# Patient Record
Sex: FEMALE | Race: WHITE | Employment: STUDENT | URBAN - METROPOLITAN AREA
[De-identification: names, ages, dates, MRNs, and addresses within clinical notes are randomized per-mention and may not be internally consistent; named-entity substitution may affect disease eponyms.]

---

## 2018-02-26 PROCEDURE — 87340 HEPATITIS B SURFACE AG IA: CPT | Performed by: OBSTETRICS & GYNECOLOGY

## 2018-02-26 PROCEDURE — 86900 BLOOD TYPING SEROLOGIC ABO: CPT | Performed by: OBSTETRICS & GYNECOLOGY

## 2018-02-26 PROCEDURE — 85027 COMPLETE CBC AUTOMATED: CPT | Performed by: OBSTETRICS & GYNECOLOGY

## 2018-02-26 PROCEDURE — 86780 TREPONEMA PALLIDUM: CPT | Performed by: OBSTETRICS & GYNECOLOGY

## 2018-02-26 PROCEDURE — 86762 RUBELLA ANTIBODY: CPT | Performed by: OBSTETRICS & GYNECOLOGY

## 2018-02-26 PROCEDURE — 87389 HIV-1 AG W/HIV-1&-2 AB AG IA: CPT | Performed by: OBSTETRICS & GYNECOLOGY

## 2018-02-26 PROCEDURE — 86850 RBC ANTIBODY SCREEN: CPT | Performed by: OBSTETRICS & GYNECOLOGY

## 2018-02-26 PROCEDURE — 87491 CHLMYD TRACH DNA AMP PROBE: CPT | Performed by: OBSTETRICS & GYNECOLOGY

## 2018-02-26 PROCEDURE — 87591 N.GONORRHOEAE DNA AMP PROB: CPT | Performed by: OBSTETRICS & GYNECOLOGY

## 2018-02-26 PROCEDURE — 36415 COLL VENOUS BLD VENIPUNCTURE: CPT | Performed by: OBSTETRICS & GYNECOLOGY

## 2018-02-26 PROCEDURE — 87086 URINE CULTURE/COLONY COUNT: CPT | Performed by: OBSTETRICS & GYNECOLOGY

## 2018-02-26 PROCEDURE — 86901 BLOOD TYPING SEROLOGIC RH(D): CPT | Performed by: OBSTETRICS & GYNECOLOGY

## 2018-02-26 PROCEDURE — 82950 GLUCOSE TEST: CPT | Performed by: OBSTETRICS & GYNECOLOGY

## 2018-02-26 PROCEDURE — 88175 CYTOPATH C/V AUTO FLUID REDO: CPT | Performed by: OBSTETRICS & GYNECOLOGY

## 2018-02-26 PROCEDURE — 85007 BL SMEAR W/DIFF WBC COUNT: CPT | Performed by: OBSTETRICS & GYNECOLOGY

## 2018-03-01 PROBLEM — O09.33 LATE PRENATAL CARE AFFECTING PREGNANCY IN THIRD TRIMESTER: Status: ACTIVE | Noted: 2018-03-01

## 2018-04-02 PROBLEM — O99.820 GBS (GROUP B STREPTOCOCCUS CARRIER), +RV CULTURE, CURRENTLY PREGNANT: Status: ACTIVE | Noted: 2018-04-02

## 2018-05-09 PROBLEM — O99.820 GBS (GROUP B STREPTOCOCCUS CARRIER), +RV CULTURE, CURRENTLY PREGNANT: Status: RESOLVED | Noted: 2018-04-02 | Resolved: 2018-05-01

## 2018-07-19 PROBLEM — O09.33 LATE PRENATAL CARE AFFECTING PREGNANCY IN THIRD TRIMESTER: Status: RESOLVED | Noted: 2018-03-01 | Resolved: 2018-07-19

## 2019-08-13 PROBLEM — O99.210 OBESITY AFFECTING PREGNANCY, ANTEPARTUM: Status: ACTIVE | Noted: 2019-08-13

## 2019-08-13 PROBLEM — O34.219 PREVIOUS CESAREAN DELIVERY AFFECTING PREGNANCY: Status: ACTIVE | Noted: 2019-08-13

## 2019-08-13 PROCEDURE — 88175 CYTOPATH C/V AUTO FLUID REDO: CPT | Performed by: OBSTETRICS & GYNECOLOGY

## 2019-08-13 PROCEDURE — 87086 URINE CULTURE/COLONY COUNT: CPT | Performed by: OBSTETRICS & GYNECOLOGY

## 2019-09-09 PROCEDURE — 86850 RBC ANTIBODY SCREEN: CPT | Performed by: OBSTETRICS & GYNECOLOGY

## 2019-09-09 PROCEDURE — 86901 BLOOD TYPING SEROLOGIC RH(D): CPT | Performed by: OBSTETRICS & GYNECOLOGY

## 2019-09-09 PROCEDURE — 86900 BLOOD TYPING SEROLOGIC ABO: CPT | Performed by: OBSTETRICS & GYNECOLOGY

## 2019-09-13 PROBLEM — Z28.3 RUBELLA NON-IMMUNE STATUS, ANTEPARTUM: Status: ACTIVE | Noted: 2019-09-13

## 2019-09-13 PROBLEM — O99.891 RUBELLA NON-IMMUNE STATUS, ANTEPARTUM: Status: ACTIVE | Noted: 2019-09-13

## 2019-09-13 PROBLEM — O09.899 RUBELLA NON-IMMUNE STATUS, ANTEPARTUM: Status: ACTIVE | Noted: 2019-09-13

## 2019-09-13 PROBLEM — Z28.39 RUBELLA NON-IMMUNE STATUS, ANTEPARTUM: Status: ACTIVE | Noted: 2019-09-13

## 2020-03-05 PROBLEM — O99.820 GBS (GROUP B STREPTOCOCCUS CARRIER), +RV CULTURE, CURRENTLY PREGNANT: Status: ACTIVE | Noted: 2020-03-05

## 2020-03-15 PROBLEM — O09.629 SUPERVISION OF HIGH-RISK PREGNANCY OF YOUNG MULTIGRAVIDA: Status: ACTIVE | Noted: 2020-03-15

## 2020-03-18 ENCOUNTER — ANESTHESIA (OUTPATIENT)
Dept: OBGYN UNIT | Facility: HOSPITAL | Age: 25
End: 2020-03-18
Payer: COMMERCIAL

## 2020-03-18 ENCOUNTER — ANESTHESIA EVENT (OUTPATIENT)
Dept: OBGYN UNIT | Facility: HOSPITAL | Age: 25
End: 2020-03-18
Payer: COMMERCIAL

## 2020-03-18 ENCOUNTER — HOSPITAL ENCOUNTER (INPATIENT)
Facility: HOSPITAL | Age: 25
LOS: 2 days | Discharge: HOME | End: 2020-03-20
Attending: OBSTETRICS & GYNECOLOGY | Admitting: OBSTETRICS & GYNECOLOGY
Payer: COMMERCIAL

## 2020-03-18 PROBLEM — Z34.90 PREGNANCY: Status: ACTIVE | Noted: 2020-03-18

## 2020-03-18 LAB
ANTIBODY SCREEN: NEGATIVE
DEPRECATED RDW RBC AUTO: 42.8 FL (ref 35.1–46.3)
ERYTHROCYTE [DISTWIDTH] IN BLOOD BY AUTOMATED COUNT: 14.7 % (ref 11–15)
HCT VFR BLD AUTO: 30.9 % (ref 35–48)
HGB BLD-MCNC: 10.4 G/DL (ref 12–16)
MCH RBC QN AUTO: 27.6 PG (ref 26–34)
MCHC RBC AUTO-ENTMCNC: 33.7 G/DL (ref 31–37)
MCV RBC AUTO: 82 FL (ref 80–100)
PLATELET # BLD AUTO: 290 10(3)UL (ref 150–450)
RBC # BLD AUTO: 3.77 X10(6)UL (ref 3.8–5.3)
RH BLOOD TYPE: POSITIVE
WBC # BLD AUTO: 9.6 X10(3) UL (ref 4–11)

## 2020-03-18 PROCEDURE — 90471 IMMUNIZATION ADMIN: CPT

## 2020-03-18 PROCEDURE — 99204 OFFICE O/P NEW MOD 45 MIN: CPT

## 2020-03-18 PROCEDURE — 85027 COMPLETE CBC AUTOMATED: CPT | Performed by: OBSTETRICS & GYNECOLOGY

## 2020-03-18 PROCEDURE — 86901 BLOOD TYPING SEROLOGIC RH(D): CPT | Performed by: OBSTETRICS & GYNECOLOGY

## 2020-03-18 PROCEDURE — 0KQM0ZZ REPAIR PERINEUM MUSCLE, OPEN APPROACH: ICD-10-PCS | Performed by: OBSTETRICS & GYNECOLOGY

## 2020-03-18 PROCEDURE — 86850 RBC ANTIBODY SCREEN: CPT | Performed by: OBSTETRICS & GYNECOLOGY

## 2020-03-18 PROCEDURE — 86900 BLOOD TYPING SEROLOGIC ABO: CPT | Performed by: OBSTETRICS & GYNECOLOGY

## 2020-03-18 RX ORDER — AMMONIA INHALANTS 0.04 G/.3ML
0.3 INHALANT RESPIRATORY (INHALATION) AS NEEDED
Status: DISCONTINUED | OUTPATIENT
Start: 2020-03-18 | End: 2020-03-18 | Stop reason: HOSPADM

## 2020-03-18 RX ORDER — AMMONIA INHALANTS 0.04 G/.3ML
0.3 INHALANT RESPIRATORY (INHALATION) AS NEEDED
Status: DISCONTINUED | OUTPATIENT
Start: 2020-03-18 | End: 2020-03-20

## 2020-03-18 RX ORDER — BUPIVACAINE HYDROCHLORIDE 2.5 MG/ML
15 INJECTION, SOLUTION EPIDURAL; INFILTRATION; INTRACAUDAL ONCE
Status: DISCONTINUED | OUTPATIENT
Start: 2020-03-18 | End: 2020-03-20

## 2020-03-18 RX ORDER — SIMETHICONE 80 MG
80 TABLET,CHEWABLE ORAL 3 TIMES DAILY PRN
Status: DISCONTINUED | OUTPATIENT
Start: 2020-03-18 | End: 2020-03-20

## 2020-03-18 RX ORDER — TERBUTALINE SULFATE 1 MG/ML
0.25 INJECTION, SOLUTION SUBCUTANEOUS AS NEEDED
Status: DISCONTINUED | OUTPATIENT
Start: 2020-03-18 | End: 2020-03-18 | Stop reason: HOSPADM

## 2020-03-18 RX ORDER — SODIUM CHLORIDE 0.9 % (FLUSH) 0.9 %
10 SYRINGE (ML) INJECTION AS NEEDED
Status: DISCONTINUED | OUTPATIENT
Start: 2020-03-18 | End: 2020-03-20

## 2020-03-18 RX ORDER — LIDOCAINE HYDROCHLORIDE 10 MG/ML
INJECTION, SOLUTION EPIDURAL; INFILTRATION; INTRACAUDAL; PERINEURAL AS NEEDED
Status: DISCONTINUED | OUTPATIENT
Start: 2020-03-18 | End: 2020-03-18 | Stop reason: SURG

## 2020-03-18 RX ORDER — CHOLECALCIFEROL (VITAMIN D3) 25 MCG
1 TABLET,CHEWABLE ORAL DAILY
Status: DISCONTINUED | OUTPATIENT
Start: 2020-03-18 | End: 2020-03-20

## 2020-03-18 RX ORDER — SODIUM CHLORIDE 0.9 % (FLUSH) 0.9 %
10 SYRINGE (ML) INJECTION AS NEEDED
Status: DISCONTINUED | OUTPATIENT
Start: 2020-03-18 | End: 2020-03-18 | Stop reason: HOSPADM

## 2020-03-18 RX ORDER — ONDANSETRON 2 MG/ML
4 INJECTION INTRAMUSCULAR; INTRAVENOUS EVERY 6 HOURS PRN
Status: DISCONTINUED | OUTPATIENT
Start: 2020-03-18 | End: 2020-03-20

## 2020-03-18 RX ORDER — BUPIVACAINE HYDROCHLORIDE 2.5 MG/ML
INJECTION, SOLUTION EPIDURAL; INFILTRATION; INTRACAUDAL AS NEEDED
Status: DISCONTINUED | OUTPATIENT
Start: 2020-03-18 | End: 2020-03-18 | Stop reason: SURG

## 2020-03-18 RX ORDER — BISACODYL 10 MG
10 SUPPOSITORY, RECTAL RECTAL ONCE AS NEEDED
Status: DISCONTINUED | OUTPATIENT
Start: 2020-03-18 | End: 2020-03-20

## 2020-03-18 RX ORDER — DEXTROSE, SODIUM CHLORIDE, SODIUM LACTATE, POTASSIUM CHLORIDE, AND CALCIUM CHLORIDE 5; .6; .31; .03; .02 G/100ML; G/100ML; G/100ML; G/100ML; G/100ML
INJECTION, SOLUTION INTRAVENOUS AS NEEDED
Status: DISCONTINUED | OUTPATIENT
Start: 2020-03-18 | End: 2020-03-18 | Stop reason: HOSPADM

## 2020-03-18 RX ORDER — DOCUSATE SODIUM 100 MG/1
100 CAPSULE, LIQUID FILLED ORAL 2 TIMES DAILY
Status: DISCONTINUED | OUTPATIENT
Start: 2020-03-18 | End: 2020-03-20

## 2020-03-18 RX ORDER — ACETAMINOPHEN 500 MG
500 TABLET ORAL ONCE AS NEEDED
Status: DISCONTINUED | OUTPATIENT
Start: 2020-03-18 | End: 2020-03-18 | Stop reason: HOSPADM

## 2020-03-18 RX ORDER — LIDOCAINE HYDROCHLORIDE AND EPINEPHRINE 20; 5 MG/ML; UG/ML
20 INJECTION, SOLUTION EPIDURAL; INFILTRATION; INTRACAUDAL; PERINEURAL ONCE
Status: DISCONTINUED | OUTPATIENT
Start: 2020-03-18 | End: 2020-03-20

## 2020-03-18 RX ORDER — IBUPROFEN 600 MG/1
600 TABLET ORAL EVERY 6 HOURS
Status: DISCONTINUED | OUTPATIENT
Start: 2020-03-18 | End: 2020-03-20

## 2020-03-18 RX ORDER — LIDOCAINE HYDROCHLORIDE 10 MG/ML
30 INJECTION, SOLUTION EPIDURAL; INFILTRATION; INTRACAUDAL; PERINEURAL ONCE
Status: DISCONTINUED | OUTPATIENT
Start: 2020-03-18 | End: 2020-03-18 | Stop reason: HOSPADM

## 2020-03-18 RX ORDER — IBUPROFEN 600 MG/1
600 TABLET ORAL ONCE AS NEEDED
Status: DISCONTINUED | OUTPATIENT
Start: 2020-03-18 | End: 2020-03-18 | Stop reason: HOSPADM

## 2020-03-18 RX ORDER — TRISODIUM CITRATE DIHYDRATE AND CITRIC ACID MONOHYDRATE 500; 334 MG/5ML; MG/5ML
30 SOLUTION ORAL AS NEEDED
Status: DISCONTINUED | OUTPATIENT
Start: 2020-03-18 | End: 2020-03-18 | Stop reason: HOSPADM

## 2020-03-18 RX ORDER — LIDOCAINE HYDROCHLORIDE AND EPINEPHRINE 15; 5 MG/ML; UG/ML
INJECTION, SOLUTION EPIDURAL AS NEEDED
Status: DISCONTINUED | OUTPATIENT
Start: 2020-03-18 | End: 2020-03-18 | Stop reason: SURG

## 2020-03-18 RX ORDER — ACETAMINOPHEN 325 MG/1
650 TABLET ORAL EVERY 6 HOURS PRN
Status: DISCONTINUED | OUTPATIENT
Start: 2020-03-18 | End: 2020-03-20

## 2020-03-18 RX ORDER — DIPHENHYDRAMINE HYDROCHLORIDE 50 MG/ML
12.5 INJECTION INTRAMUSCULAR; INTRAVENOUS EVERY 4 HOURS PRN
Status: DISCONTINUED | OUTPATIENT
Start: 2020-03-18 | End: 2020-03-20

## 2020-03-18 RX ORDER — DIAPER,BRIEF,INFANT-TODD,DISP
1 EACH MISCELLANEOUS EVERY 6 HOURS PRN
Status: DISCONTINUED | OUTPATIENT
Start: 2020-03-18 | End: 2020-03-20

## 2020-03-18 RX ORDER — SODIUM CHLORIDE, SODIUM LACTATE, POTASSIUM CHLORIDE, CALCIUM CHLORIDE 600; 310; 30; 20 MG/100ML; MG/100ML; MG/100ML; MG/100ML
INJECTION, SOLUTION INTRAVENOUS CONTINUOUS
Status: DISCONTINUED | OUTPATIENT
Start: 2020-03-18 | End: 2020-03-18 | Stop reason: HOSPADM

## 2020-03-18 RX ORDER — EPHEDRINE SULFATE/0.9% NACL/PF 25 MG/5 ML
5 SYRINGE (ML) INTRAVENOUS AS NEEDED
Status: DISCONTINUED | OUTPATIENT
Start: 2020-03-18 | End: 2020-03-20

## 2020-03-18 RX ADMIN — BUPIVACAINE HYDROCHLORIDE 5 MG: 2.5 INJECTION, SOLUTION EPIDURAL; INFILTRATION; INTRACAUDAL at 06:10:00

## 2020-03-18 RX ADMIN — LIDOCAINE HYDROCHLORIDE 3 ML: 10 INJECTION, SOLUTION EPIDURAL; INFILTRATION; INTRACAUDAL; PERINEURAL at 06:07:00

## 2020-03-18 RX ADMIN — LIDOCAINE HYDROCHLORIDE AND EPINEPHRINE 5 ML: 15; 5 INJECTION, SOLUTION EPIDURAL at 06:11:00

## 2020-03-18 NOTE — PROGRESS NOTES
Pt is a 22year old female admitted to TR1/TR1-A. Patient presents with:  Contractions: since      Pt is  39w5d intra-uterine pregnancy. History obtained, consents signed. Oriented to room, staff, and plan of care.

## 2020-03-18 NOTE — PROGRESS NOTES
Received patient into room 357 via WC . Bedside shift report received from [facility]. Pt transferred to bed. Bed in lowest and locked position. Side rails up x2. VSS. IV site unremarkable. Baby present in open crib. ID bands matched with L&D RN.   Patient

## 2020-03-18 NOTE — PROGRESS NOTES
Pt is a 22year old female admitted to [place] [address]. Patient presents with:  Contractions: since      Pt is  39w5d intra-uterine pregnancy. History obtained, consents signed. Oriented to room, staff, and plan of care.

## 2020-03-18 NOTE — PROGRESS NOTES
After VE, encouraged pt to have epidural.  Pt c/o urge to push informed of cervix at lip.   Pt agrees to epidural.

## 2020-03-18 NOTE — PROGRESS NOTES
Patient up to bathroom with assist x 2. Voided 200. Patient transferred to mother/baby room 357 per wheelchair in stable condition with baby and personal belongings. Accompanied by significant other and staff. Report given to mother/baby AMADO Erickson.

## 2020-03-18 NOTE — H&P
[address] Patient Status:  Observation    3/10/1995 MRN [id number]   Location [address] [place] [address] Attending Allyn Sandifer, MD   Hosp Day # 0 PCP Glendy Jackson MD     Date of A Social History    Tobacco Use      Smoking status: Former Smoker        Packs/day: 0.00        Quit date: 2/15/2018        Years since quittin.0      Smokeless tobacco: Never Used    Alcohol use:  Yes      Alcohol/week: 0.0 standard drinks      Comment:

## 2020-03-18 NOTE — ANESTHESIA PROCEDURE NOTES
Labor Analgesia  Performed by: Erica Ware MD  Authorized by: Erica Ware MD       General Information and Staff    Start Time:  3/18/2020 6:00 AM  End Time:  3/18/2020 6:10 AM  Anesthesiologist:  Erica Ware MD  Performed by:   Anesthesiologist

## 2020-03-18 NOTE — L&D DELIVERY NOTE
[facility]OSP - [facility]    Vaginal Delivery Note    Nirav Lint Patient Status:  Inpatient    3/10/1995 MRN [id number]   Location [address] Attending Aby Abarca MD   Hosp Day # 0 PCP Kassie Silverman MD     Delivery

## 2020-03-18 NOTE — ANESTHESIA PREPROCEDURE EVALUATION
Anesthesia PreOp Note    HPI:     Cinthya Rainey is a 22year old female who presents for preoperative consultation requested by: * No surgeons listed *    Date of Surgery: 3/18/2020    * No procedures listed *  Indication: * No pre-op diagnosis entered * MD  ibuprofen (MOTRIN) tab 600 mg, 600 mg, Oral, Once PRN, Allayne Pallas, MD  oxyTOCIN (PITOCIN) 30 units/ 500 ml 0.9% NS premix infusion, 300 mL/hr, Intravenous, Continuous, AdeHeather lopez MD  Terbutaline Sulfate (BRETHINE) 1 MG/ML injection 0.25 mg, Not on file      Number of children: 1      Years of education: Not on file      Highest education level: Not on file    Occupational History      Not on file    Social Needs      Financial resource strain: Not on file      Food insecurity:        Worry: N MCHC 32.8 12/23/2019    RDW 14.7 03/18/2020    RDW 12.6 12/23/2019    .0 03/18/2020     12/23/2019             Vital Signs: There is no height or weight on file to calculate BMI. oral temperature is 98.3 °F (36.8 °C).  Her blood pressure

## 2020-03-19 PROBLEM — O34.219 PREVIOUS CESAREAN DELIVERY AFFECTING PREGNANCY: Status: RESOLVED | Noted: 2019-08-13 | Resolved: 2020-03-18

## 2020-03-19 PROBLEM — O99.820 GBS (GROUP B STREPTOCOCCUS CARRIER), +RV CULTURE, CURRENTLY PREGNANT: Status: RESOLVED | Noted: 2020-03-05 | Resolved: 2020-03-18

## 2020-03-19 LAB
BASOPHILS # BLD AUTO: 0.02 X10(3) UL (ref 0–0.2)
BASOPHILS NFR BLD AUTO: 0.2 %
DEPRECATED RDW RBC AUTO: 46.5 FL (ref 35.1–46.3)
EOSINOPHIL # BLD AUTO: 0.09 X10(3) UL (ref 0–0.7)
EOSINOPHIL NFR BLD AUTO: 0.9 %
ERYTHROCYTE [DISTWIDTH] IN BLOOD BY AUTOMATED COUNT: 15.5 % (ref 11–15)
HCT VFR BLD AUTO: 26.2 % (ref 35–48)
HGB BLD-MCNC: 8.6 G/DL (ref 12–16)
IMM GRANULOCYTES # BLD AUTO: 0.09 X10(3) UL (ref 0–1)
IMM GRANULOCYTES NFR BLD: 0.9 %
LYMPHOCYTES # BLD AUTO: 1.64 X10(3) UL (ref 1–4)
LYMPHOCYTES NFR BLD AUTO: 16.4 %
MCH RBC QN AUTO: 27.4 PG (ref 26–34)
MCHC RBC AUTO-ENTMCNC: 32.8 G/DL (ref 31–37)
MCV RBC AUTO: 83.4 FL (ref 80–100)
MONOCYTES # BLD AUTO: 0.5 X10(3) UL (ref 0.1–1)
MONOCYTES NFR BLD AUTO: 5 %
NEUTROPHILS # BLD AUTO: 7.68 X10 (3) UL (ref 1.5–7.7)
NEUTROPHILS # BLD AUTO: 7.68 X10(3) UL (ref 1.5–7.7)
NEUTROPHILS NFR BLD AUTO: 76.6 %
PLATELET # BLD AUTO: 237 10(3)UL (ref 150–450)
RBC # BLD AUTO: 3.14 X10(6)UL (ref 3.8–5.3)
WBC # BLD AUTO: 10 X10(3) UL (ref 4–11)

## 2020-03-19 PROCEDURE — 85025 COMPLETE CBC W/AUTO DIFF WBC: CPT | Performed by: OBSTETRICS & GYNECOLOGY

## 2020-03-19 NOTE — LACTATION NOTE
LACTATION NOTE - MOTHER      Evaluation Type: Inpatient    Problems identified  Problems identified: Knowledge deficit    Maternal history  Other/comment: ADHD, asthma    Breastfeeding goal  Breastfeeding goal: To maintain breast milk feeding per patient g

## 2020-03-19 NOTE — PROGRESS NOTES
Pt doing well, pain controllled, td wnl   20  0936   BP: 121/52   Pulse: 87   Resp: 18   Temp: 97.6 °F (36.4 °C)     Gen: nad    A/P; 22year old  PPD 1 s/p   1) doing well,    D/w pt r/b/a of circumcision, will proceed.  All questions a

## 2020-03-19 NOTE — PLAN OF CARE
Sat with patient to review plan of care. Discussed analgesic options and answered all questions. VSS. Breastfeeding on demand. Breastfeeding successfully. Voiding independently. Lochia is WNL. Uterus is firm.      Problem: PAIN - ADULT  Goal: Verbalizes/dis and use personal protective equipment as indicated. Ensure aseptic care of all intravenous lines and invasive tubes/drains.  - Obtain immunization and exposure to communicable diseases history.   Outcome: Progressing  Goal: Optimize infant feeding at the br equipment/supplies, instructions, and assistance until it is safe to breastfeed infant. Outcome: Progressing  Goal: Experiences normal breast weaning course  Description  INTERVENTIONS:  - Assess for and manage engorgement. - Instruct on breast care.   -

## 2020-03-20 VITALS
RESPIRATION RATE: 16 BRPM | TEMPERATURE: 98 F | OXYGEN SATURATION: 97 % | DIASTOLIC BLOOD PRESSURE: 70 MMHG | BODY MASS INDEX: 43 KG/M2 | HEART RATE: 90 BPM | SYSTOLIC BLOOD PRESSURE: 125 MMHG | WEIGHT: 252 LBS

## 2020-03-20 RX ORDER — IBUPROFEN 600 MG/1
600 TABLET ORAL EVERY 6 HOURS
Qty: 30 TABLET | Refills: 0 | Status: SHIPPED | OUTPATIENT
Start: 2020-03-20 | End: 2020-05-01

## 2020-03-20 NOTE — PROGRESS NOTES
[facility]    OB/Gyne Post  Progress Note      Sheyla Duval Patient Status:  Inpatient    3/10/1995 MRN [id number]   Location [facility] 3SE Attending Suki Alexander MD   Hosp Day # 2 PCP Dina Hu MD       Subjective

## 2020-03-20 NOTE — PLAN OF CARE
Problem: POSTPARTUM  Goal: Long Term Goal:Experiences normal postpartum course  Description  INTERVENTIONS:  - Assess and monitor vital signs and lab values. - Assess fundus and lochia. - Provide ice/sitz baths for perineum discomfort.   - Monitor heali for signs of nipple pain/trauma. - Instruct and provide assistance with proper latch. - Review techniques for milk expression (breast pumping) and storage of breast milk. Provide pumping equipment/supplies, instructions and assistance, as needed.   Annika Tang

## 2020-03-20 NOTE — CM/SW NOTE
MDO received for EPDS 9. SW met with the pt, alone with baby on her chest. Baby boy Brandi Guillen. Big brother is 3years old. Pt confirmed address, phone as listed on the facesheet.  Her insurance [employer] [facility]) is through her stepdad's employ

## 2020-03-20 NOTE — DISCHARGE SUMMARY
[facility]    Obstetrical Discharge Summary    Mary Sanford Patient Status:  Inpatient    3/10/1995 MRN [id number]   Location [facility] 3SE Attending Antonella Locke MD   Hosp Day # 2 PCP Antonia Campuzano MD     Date of Admiss hour(s))   CBC, PLATELET; NO DIFFERENTIAL    Collection Time: 03/18/20  4:35 AM   Result Value Ref Range    WBC 9.6 4.0 - 11.0 x10(3) uL    RBC 3.77 (L) 3.80 - 5.30 x10(6)uL    HGB 10.4 (L) 12.0 - 16.0 g/dL    HCT 30.9 (L) 35.0 - 48.0 %    MCV 82.0 80.0 - 6 (six) hours. Home Meds - Unchanged    Prenatal Multivit-Min-Fe-FA (PRENATAL OR)  Take 1 tablet by mouth daily. Discharge Diet: As tolerated    Discharge Activity: As tolerated    Follow up:      Follow-up Information     Lorenza Ruiz

## 2020-03-20 NOTE — ANESTHESIA POSTPROCEDURE EVALUATION
Patient: Thuy Quinn    Procedure Summary     Date:  03/18/20 Room / Location:      Anesthesia Start:  0600 Anesthesia Stop:  8914    Procedure:  LABOR ANALGESIA Diagnosis:      Scheduled Providers:   Anesthesiologist:  Ila Rodriguez MD    Anesthesia Typ

## 2020-03-23 ENCOUNTER — TELEPHONE (OUTPATIENT)
Dept: OBGYN UNIT | Facility: HOSPITAL | Age: 25
End: 2020-03-23

## 2020-03-25 ENCOUNTER — TELEPHONE (OUTPATIENT)
Dept: OBGYN UNIT | Facility: HOSPITAL | Age: 25
End: 2020-03-25

## 2021-08-02 PROBLEM — Z28.39 RUBELLA NON-IMMUNE STATUS, ANTEPARTUM: Status: RESOLVED | Noted: 2019-09-13 | Resolved: 2021-08-02

## 2021-08-02 PROBLEM — O09.899 RUBELLA NON-IMMUNE STATUS, ANTEPARTUM: Status: RESOLVED | Noted: 2019-09-13 | Resolved: 2021-08-02

## 2021-08-02 PROBLEM — Z28.3 RUBELLA NON-IMMUNE STATUS, ANTEPARTUM: Status: RESOLVED | Noted: 2019-09-13 | Resolved: 2021-08-02

## 2021-08-02 PROBLEM — O99.891 RUBELLA NON-IMMUNE STATUS, ANTEPARTUM: Status: RESOLVED | Noted: 2019-09-13 | Resolved: 2021-08-02

## 2021-09-02 PROBLEM — O34.219 HX SUCCESSFUL VBAC (VAGINAL BIRTH AFTER CESAREAN), CURRENTLY PREGNANT: Status: ACTIVE | Noted: 2021-09-02

## 2021-09-29 ENCOUNTER — HOSPITAL ENCOUNTER (OUTPATIENT)
Dept: PERINATAL CARE | Facility: HOSPITAL | Age: 26
Discharge: HOME | End: 2021-09-29
Attending: OBSTETRICS & GYNECOLOGY
Payer: COMMERCIAL

## 2021-09-29 ENCOUNTER — APPOINTMENT (OUTPATIENT)
Dept: OBGYN CLINIC | Facility: HOSPITAL | Age: 26
End: 2021-09-29
Payer: COMMERCIAL

## 2021-09-29 ENCOUNTER — HOSPITAL ENCOUNTER (INPATIENT)
Facility: HOSPITAL | Age: 26
LOS: 1 days | Discharge: HOME | End: 2021-09-29
Attending: OBSTETRICS & GYNECOLOGY | Admitting: OBSTETRICS & GYNECOLOGY
Payer: COMMERCIAL

## 2021-09-29 VITALS — DIASTOLIC BLOOD PRESSURE: 80 MMHG | SYSTOLIC BLOOD PRESSURE: 127 MMHG | HEART RATE: 91 BPM

## 2021-09-29 VITALS
TEMPERATURE: 98 F | RESPIRATION RATE: 16 BRPM | DIASTOLIC BLOOD PRESSURE: 74 MMHG | HEART RATE: 89 BPM | SYSTOLIC BLOOD PRESSURE: 128 MMHG

## 2021-09-29 DIAGNOSIS — O09.629 SUPERVISION OF HIGH-RISK PREGNANCY OF YOUNG MULTIGRAVIDA: ICD-10-CM

## 2021-09-29 DIAGNOSIS — O99.210 OBESITY AFFECTING PREGNANCY, ANTEPARTUM: ICD-10-CM

## 2021-09-29 DIAGNOSIS — O36.4XX0 FETAL DEMISE BEFORE 22 WEEKS WITH RETENTION OF DEAD FETUS: Primary | ICD-10-CM

## 2021-09-29 DIAGNOSIS — O36.4XX0 FETAL DEMISE BEFORE 22 WEEKS WITH RETENTION OF DEAD FETUS: ICD-10-CM

## 2021-09-29 DIAGNOSIS — O02.1 FETAL DEMISE BEFORE 20 WEEKS WITH RETENTION OF DEAD FETUS: ICD-10-CM

## 2021-09-29 PROCEDURE — 85730 THROMBOPLASTIN TIME PARTIAL: CPT | Performed by: OBSTETRICS & GYNECOLOGY

## 2021-09-29 PROCEDURE — 85460 HEMOGLOBIN FETAL: CPT | Performed by: OBSTETRICS & GYNECOLOGY

## 2021-09-29 PROCEDURE — 86901 BLOOD TYPING SEROLOGIC RH(D): CPT | Performed by: OBSTETRICS & GYNECOLOGY

## 2021-09-29 PROCEDURE — 86747 PARVOVIRUS ANTIBODY: CPT | Performed by: OBSTETRICS & GYNECOLOGY

## 2021-09-29 PROCEDURE — 10907ZC DRAINAGE OF AMNIOTIC FLUID, THERAPEUTIC FROM PRODUCTS OF CONCEPTION, VIA NATURAL OR ARTIFICIAL OPENING: ICD-10-PCS | Performed by: OBSTETRICS & GYNECOLOGY

## 2021-09-29 PROCEDURE — 86644 CMV ANTIBODY: CPT | Performed by: OBSTETRICS & GYNECOLOGY

## 2021-09-29 PROCEDURE — 86146 BETA-2 GLYCOPROTEIN ANTIBODY: CPT | Performed by: OBSTETRICS & GYNECOLOGY

## 2021-09-29 PROCEDURE — 88305 TISSUE EXAM BY PATHOLOGIST: CPT | Performed by: OBSTETRICS & GYNECOLOGY

## 2021-09-29 PROCEDURE — 76946 ECHO GUIDE FOR AMNIOCENTESIS: CPT | Performed by: OBSTETRICS & GYNECOLOGY

## 2021-09-29 PROCEDURE — 86694 HERPES SIMPLEX NES ANTBDY: CPT | Performed by: OBSTETRICS & GYNECOLOGY

## 2021-09-29 PROCEDURE — 3E0P7VZ INTRODUCTION OF HORMONE INTO FEMALE REPRODUCTIVE, VIA NATURAL OR ARTIFICIAL OPENING: ICD-10-PCS | Performed by: OBSTETRICS & GYNECOLOGY

## 2021-09-29 PROCEDURE — 83036 HEMOGLOBIN GLYCOSYLATED A1C: CPT | Performed by: OBSTETRICS & GYNECOLOGY

## 2021-09-29 PROCEDURE — 86645 CMV ANTIBODY IGM: CPT | Performed by: OBSTETRICS & GYNECOLOGY

## 2021-09-29 PROCEDURE — 99213 OFFICE O/P EST LOW 20 MIN: CPT | Performed by: OBSTETRICS & GYNECOLOGY

## 2021-09-29 PROCEDURE — 85384 FIBRINOGEN ACTIVITY: CPT | Performed by: OBSTETRICS & GYNECOLOGY

## 2021-09-29 PROCEDURE — 87798 DETECT AGENT NOS DNA AMP: CPT | Performed by: OBSTETRICS & GYNECOLOGY

## 2021-09-29 PROCEDURE — 85613 RUSSELL VIPER VENOM DILUTED: CPT | Performed by: OBSTETRICS & GYNECOLOGY

## 2021-09-29 PROCEDURE — 85610 PROTHROMBIN TIME: CPT | Performed by: OBSTETRICS & GYNECOLOGY

## 2021-09-29 PROCEDURE — 85598 HEXAGNAL PHOSPH PLTLT NEUTRL: CPT | Performed by: OBSTETRICS & GYNECOLOGY

## 2021-09-29 PROCEDURE — 86147 CARDIOLIPIN ANTIBODY EA IG: CPT | Performed by: OBSTETRICS & GYNECOLOGY

## 2021-09-29 PROCEDURE — 76815 OB US LIMITED FETUS(S): CPT | Performed by: OBSTETRICS & GYNECOLOGY

## 2021-09-29 PROCEDURE — 85025 COMPLETE CBC W/AUTO DIFF WBC: CPT | Performed by: OBSTETRICS & GYNECOLOGY

## 2021-09-29 PROCEDURE — 59000 AMNIOCENTESIS DIAGNOSTIC: CPT | Performed by: OBSTETRICS & GYNECOLOGY

## 2021-09-29 PROCEDURE — 86762 RUBELLA ANTIBODY: CPT | Performed by: OBSTETRICS & GYNECOLOGY

## 2021-09-29 PROCEDURE — 85390 FIBRINOLYSINS SCREEN I&R: CPT | Performed by: OBSTETRICS & GYNECOLOGY

## 2021-09-29 PROCEDURE — 86900 BLOOD TYPING SEROLOGIC ABO: CPT | Performed by: OBSTETRICS & GYNECOLOGY

## 2021-09-29 PROCEDURE — 84443 ASSAY THYROID STIM HORMONE: CPT | Performed by: OBSTETRICS & GYNECOLOGY

## 2021-09-29 PROCEDURE — 86850 RBC ANTIBODY SCREEN: CPT | Performed by: OBSTETRICS & GYNECOLOGY

## 2021-09-29 RX ORDER — ONDANSETRON 2 MG/ML
4 INJECTION INTRAMUSCULAR; INTRAVENOUS EVERY 6 HOURS PRN
Status: DISCONTINUED | OUTPATIENT
Start: 2021-09-29 | End: 2021-09-30

## 2021-09-29 RX ORDER — IBUPROFEN 600 MG/1
600 TABLET ORAL EVERY 6 HOURS PRN
Status: DISCONTINUED | OUTPATIENT
Start: 2021-09-29 | End: 2021-09-30

## 2021-09-29 RX ORDER — ACETAMINOPHEN 500 MG
500 TABLET ORAL EVERY 6 HOURS PRN
Qty: 60 TABLET | Refills: 0 | Status: SHIPPED | OUTPATIENT
Start: 2021-09-29

## 2021-09-29 RX ORDER — IBUPROFEN 600 MG/1
600 TABLET ORAL EVERY 6 HOURS PRN
Qty: 60 TABLET | Refills: 0 | Status: SHIPPED | OUTPATIENT
Start: 2021-09-29 | End: 2021-11-16

## 2021-09-29 RX ORDER — ACETAMINOPHEN 500 MG
500 TABLET ORAL EVERY 6 HOURS PRN
Status: DISCONTINUED | OUTPATIENT
Start: 2021-09-29 | End: 2021-09-30

## 2021-09-29 RX ORDER — MISOPROSTOL 100 UG/1
100 TABLET ORAL EVERY 6 HOURS
Status: DISCONTINUED | OUTPATIENT
Start: 2021-09-29 | End: 2021-09-30

## 2021-09-29 NOTE — SPIRITUAL CARE NOTE
I will consider this complete and await a call from [facility] as to what to do.

## 2021-09-29 NOTE — CM/SW NOTE
MDO; IUFD    Patient had IOL for IUFD confirm with fetus at ~16wks size yesterday/US-M. Per RN/Joan pt is currently not ready for CM visit.   However, per nurse there are handouts in patient folder on \"SHARE\" program that she will point out to pa

## 2021-09-29 NOTE — PROGRESS NOTES
Pt for Diagnostic Amnio  Hx IUFD  Pre and post procedure instructions discussed handout given  Consent signed  Labs drawn per [employer] request

## 2021-09-29 NOTE — PROGRESS NOTES
Reason for Consult:   Dear Dr. Jonathon Bhagat,    Thank you for requesting ultrasound evaluation and maternal fetal medicine consultation on [address] [place] [address]. As you are aware she is a 32year old female with a Pearl pregnancy at 19w4d.   A maternal-fetal med past   • Asthma     childhood-inhaler   • Depression     stopped prozac 18   • Nausea & vomiting    • Varicella     Immunized      Past Surgical History:   Procedure Laterality Date   •      • OTHER SURGICAL HISTORY      wisdom teeth ULTRASOUND REPORT   See imaging tab for complete ultrasound report or in PACS    Single IUP in breech presentation. Placenta is posterior. Cardiac activity is absent. IUFD. Fetal [place] sign seen.     Amniocentesis  Start 8:03 AM. End 8:04 AM  Instr

## 2021-09-29 NOTE — PROGRESS NOTES
Pt is a 32year old female admitted to 371/371-A. Patient presents with:  Iufd: 20 3/7 weeks     Pt is  20w3d intra-uterine pregnancy. History obtained, consents signed. Oriented to room, staff, and plan of care.

## 2021-09-29 NOTE — H&P
[facility] Patient Status:  Inpatient    3/10/1995 MRN [id number]   Location [address] Attending Mo Myles MD   Hosp Day # 0 PCP Anastasia Winchester MD     Date of A • OTHER SURGICAL HISTORY      wisdom teeth     Family History:   Family History   Problem Relation Age of Onset   • Diabetes Paternal Grandmother    • Heart Surgery Paternal Grandmother    • Arthritis Paternal Grandmother    • Heart Disease Maternal [facility] [place] Hepatitis B surface antigen non-reactive  Lab Results   Component Value Date    WBC 8.0 09/29/2021    HGB 11.0 09/29/2021    HCT 33.2 09/29/2021    .0 09/29/2021    PTT 35.8 09/29/2021    PTT 33.7 09/29/2021    INR 1.10 09/29/2021    TSH 0.961 09/29

## 2021-09-29 NOTE — SPIRITUAL CARE NOTE
Subjective: Went to floor and Pt was not ready for  visit according to nurse. Pratik Mckinney)- She will call me when Sarah Many wishes to see . Objective; Await nurse contact instructions. ..  Did charting and visitation to 5W while awaiting call r

## 2021-09-30 NOTE — L&D DELIVERY NOTE
Feliciano Kaur, Pending FD [[id number]]    Labor Events     labor?: No   steroids?: None  Cervical ripening date/time: 2021 1011  Cervical ripening type: Misoprostol  Antibiotics received during labor?: No  Antibiotics (enter # doses in comment) Attending Carline Haile MD   Hosp Day # 0 PCP Isaak Pierce MD     Delivery     Infant  Date of Delivery: 9/29/2021   Time of Delivery: 5:55 PM  Delivery Type:      Infant Sex/Birthweight: child No birth weight on file.      Presentation    Position

## 2021-09-30 NOTE — PROGRESS NOTES
Discharge orders received. All paperwork filled out and appropriate personnel notified. Mementoes and grief folder given to parents. All questions answered and AVS explained. Parents agreeable to discharge. Able to ambulate off unit independently.

## 2021-09-30 NOTE — DISCHARGE SUMMARY
[facility]    Discharge Summary    Mel Smith Patient Status:  Inpatient    3/10/1995 MRN [id number]   Location [address] Attending Dejan Lorenz MD   Hosp Day # 0 PCP Yocasta Lyons MD     Date of with provider. Discharge Diet: As tolerated    Discharge Activity: As tolerated    Follow up: Follow-up Information     Shan Chu MD In 2 weeks.     Specialty: OBSTETRICS & GYNECOLOGY  Contact information:  [address] [place] [address]

## 2021-09-30 NOTE — PROGRESS NOTES
09/29/21 2000   Clinical Encounter Type   Visited With Patient and family together; Health care provider   Crisis Visit Death   Referral From Nurse   Referral To    Patient Spiritual Encounters   Feelings of Loneliness/Hopelessness Sadness   Fam

## 2021-10-05 ENCOUNTER — TELEPHONE (OUTPATIENT)
Dept: OBGYN UNIT | Facility: HOSPITAL | Age: 26
End: 2021-10-05

## 2021-10-13 ENCOUNTER — TELEPHONE (OUTPATIENT)
Dept: OBGYN UNIT | Facility: HOSPITAL | Age: 26
End: 2021-10-13

## 2021-10-27 ENCOUNTER — TELEPHONE (OUTPATIENT)
Dept: PERINATAL CARE | Facility: HOSPITAL | Age: 26
End: 2021-10-27

## 2021-11-02 ENCOUNTER — TELEPHONE (OUTPATIENT)
Dept: OBGYN UNIT | Facility: HOSPITAL | Age: 26
End: 2021-11-02

## 2021-11-16 PROBLEM — Z34.90 PREGNANCY: Status: RESOLVED | Noted: 2020-03-18 | Resolved: 2021-11-16

## 2021-11-16 PROBLEM — O99.210 OBESITY AFFECTING PREGNANCY, ANTEPARTUM: Status: RESOLVED | Noted: 2019-08-13 | Resolved: 2021-11-16

## 2021-11-16 PROBLEM — O09.629 SUPERVISION OF HIGH-RISK PREGNANCY OF YOUNG MULTIGRAVIDA: Status: RESOLVED | Noted: 2020-03-15 | Resolved: 2021-11-16

## 2021-11-16 PROBLEM — F41.8 DEPRESSION WITH ANXIETY: Status: ACTIVE | Noted: 2021-11-16

## 2021-11-16 PROBLEM — O02.1 FETAL DEMISE BEFORE 20 WEEKS WITH RETENTION OF DEAD FETUS: Status: RESOLVED | Noted: 2021-09-29 | Resolved: 2021-11-16

## 2021-11-16 PROBLEM — J98.01 BRONCHOSPASM: Status: ACTIVE | Noted: 2021-11-16

## 2021-11-16 PROBLEM — O34.219 HX SUCCESSFUL VBAC (VAGINAL BIRTH AFTER CESAREAN), CURRENTLY PREGNANT: Status: RESOLVED | Noted: 2021-09-02 | Resolved: 2021-11-16

## 2021-12-07 ENCOUNTER — TELEPHONE (OUTPATIENT)
Dept: OBGYN UNIT | Facility: HOSPITAL | Age: 26
End: 2021-12-07

## 2021-12-07 NOTE — PROGRESS NOTES
Pt had called the hospital last month looking for her baby, IUFD on 21. Pt stated that she called the  home and they did not have the baby. Pt was told that the hospital would contact the  home to  the baby.  Instructed the patient

## 2022-01-04 ENCOUNTER — TELEPHONE (OUTPATIENT)
Dept: OBGYN UNIT | Facility: HOSPITAL | Age: 27
End: 2022-01-04

## 2022-02-01 ENCOUNTER — TELEPHONE (OUTPATIENT)
Dept: OBGYN UNIT | Facility: HOSPITAL | Age: 27
End: 2022-02-01

## 2022-04-05 ENCOUNTER — TELEPHONE (OUTPATIENT)
Dept: OBGYN UNIT | Facility: HOSPITAL | Age: 27
End: 2022-04-05

## 2022-04-11 LAB
AMB EXT TREPONEMAL ANTIBODIES: NEGATIVE
AMB EXT TREPONEMAL ANTIBODIES: NEGATIVE
ANTIBODY SCREEN OB: NEGATIVE
ANTIBODY SCREEN OB: NEGATIVE
HEPATITIS B SURFACE ANTIGEN OB: NEGATIVE
HEPATITIS B SURFACE ANTIGEN OB: NEGATIVE
HIV RESULT OB: NEGATIVE
HIV RESULT OB: NEGATIVE

## 2022-05-18 ENCOUNTER — HOSPITAL ENCOUNTER (OUTPATIENT)
Dept: PERINATAL CARE | Facility: HOSPITAL | Age: 27
Discharge: HOME | End: 2022-05-18
Attending: OBSTETRICS & GYNECOLOGY
Payer: COMMERCIAL

## 2022-05-18 VITALS
WEIGHT: 266 LBS | DIASTOLIC BLOOD PRESSURE: 62 MMHG | SYSTOLIC BLOOD PRESSURE: 120 MMHG | BODY MASS INDEX: 46 KG/M2 | HEART RATE: 88 BPM

## 2022-05-18 DIAGNOSIS — O99.211 OBESITY AFFECTING PREGNANCY IN FIRST TRIMESTER: ICD-10-CM

## 2022-05-18 DIAGNOSIS — Z36.9 FIRST TRIMESTER SCREENING: ICD-10-CM

## 2022-05-18 DIAGNOSIS — Z36.9 FIRST TRIMESTER SCREENING: Primary | ICD-10-CM

## 2022-05-18 PROCEDURE — 76813 OB US NUCHAL MEAS 1 GEST: CPT | Performed by: OBSTETRICS & GYNECOLOGY

## 2022-05-18 PROCEDURE — 36415 COLL VENOUS BLD VENIPUNCTURE: CPT

## 2022-06-01 ENCOUNTER — TELEPHONE (OUTPATIENT)
Dept: PERINATAL CARE | Facility: HOSPITAL | Age: 27
End: 2022-06-01

## 2022-06-01 NOTE — TELEPHONE ENCOUNTER
Panorama screening results obt  Reviewed by DR Ryan Tuttle    Results:  low risk  Low Risk for Aneuploidies, triploidy and Monosomy X  Fetal Sex: female  Fetal Fraction: 4.4      Pt states understanding  Copy of results sent for scanning into pt record

## 2022-06-02 ENCOUNTER — TELEPHONE (OUTPATIENT)
Dept: PERINATAL CARE | Facility: HOSPITAL | Age: 27
End: 2022-06-02

## 2022-06-07 ENCOUNTER — TELEPHONE (OUTPATIENT)
Dept: OBGYN UNIT | Facility: HOSPITAL | Age: 27
End: 2022-06-07

## 2022-07-13 ENCOUNTER — HOSPITAL ENCOUNTER (OUTPATIENT)
Dept: PERINATAL CARE | Facility: HOSPITAL | Age: 27
Discharge: HOME | End: 2022-07-13
Attending: OBSTETRICS & GYNECOLOGY
Payer: COMMERCIAL

## 2022-07-13 VITALS
SYSTOLIC BLOOD PRESSURE: 136 MMHG | BODY MASS INDEX: 45 KG/M2 | DIASTOLIC BLOOD PRESSURE: 73 MMHG | WEIGHT: 264 LBS | HEART RATE: 86 BPM

## 2022-07-13 DIAGNOSIS — Z36.3 SCREENING, ANTENATAL, FOR MALFORMATION BY ULTRASOUND: ICD-10-CM

## 2022-07-13 DIAGNOSIS — O99.212 OBESITY AFFECTING PREGNANCY IN SECOND TRIMESTER: ICD-10-CM

## 2022-07-13 DIAGNOSIS — O99.212 OBESITY AFFECTING PREGNANCY IN SECOND TRIMESTER: Primary | ICD-10-CM

## 2022-07-13 PROCEDURE — 76811 OB US DETAILED SNGL FETUS: CPT | Performed by: OBSTETRICS & GYNECOLOGY

## 2022-09-06 ENCOUNTER — TELEPHONE (OUTPATIENT)
Dept: OBGYN UNIT | Facility: HOSPITAL | Age: 27
End: 2022-09-06

## 2022-09-12 LAB
AMB EXT TREPONEMAL ANTIBODIES: NEGATIVE
AMB EXT TREPONEMAL ANTIBODIES: NEGATIVE
HIV RESULT OB: NEGATIVE
HIV RESULT OB: NEGATIVE

## 2022-11-15 ENCOUNTER — ORDER TRANSCRIPTION (OUTPATIENT)
Dept: ADMINISTRATIVE | Facility: HOSPITAL | Age: 27
End: 2022-11-15

## 2022-11-15 DIAGNOSIS — Z11.59 SCREENING FOR VIRAL DISEASE: ICD-10-CM

## 2022-11-15 DIAGNOSIS — Z01.818 PRE-OP TESTING: Primary | ICD-10-CM

## 2022-11-15 DIAGNOSIS — Z01.818 PRE-PROCEDURAL EXAMINATION: Primary | ICD-10-CM

## 2022-11-21 ENCOUNTER — LAB ENCOUNTER (OUTPATIENT)
Dept: LAB | Facility: HOSPITAL | Age: 27
End: 2022-11-21
Attending: OBSTETRICS & GYNECOLOGY
Payer: COMMERCIAL

## 2022-11-21 DIAGNOSIS — Z01.818 PREOPERATIVE EXAMINATION, UNSPECIFIED: Primary | ICD-10-CM

## 2022-11-21 DIAGNOSIS — Z11.59 SCREENING FOR VIRAL DISEASE: ICD-10-CM

## 2022-11-21 DIAGNOSIS — Z01.818 PRE-PROCEDURAL EXAMINATION: ICD-10-CM

## 2022-11-21 LAB
ANTIBODY SCREEN: NEGATIVE
BASOPHILS # BLD AUTO: 0.02 X10(3) UL (ref 0–0.2)
BASOPHILS NFR BLD AUTO: 0.2 %
DEPRECATED RDW RBC AUTO: 43.3 FL (ref 35.1–46.3)
EOSINOPHIL # BLD AUTO: 0.11 X10(3) UL (ref 0–0.7)
EOSINOPHIL NFR BLD AUTO: 1.2 %
ERYTHROCYTE [DISTWIDTH] IN BLOOD BY AUTOMATED COUNT: 14.6 % (ref 11–15)
HCT VFR BLD AUTO: 30.2 %
HGB BLD-MCNC: 9.8 G/DL
IMM GRANULOCYTES # BLD AUTO: 0.09 X10(3) UL (ref 0–1)
IMM GRANULOCYTES NFR BLD: 1 %
LYMPHOCYTES # BLD AUTO: 1.42 X10(3) UL (ref 1–4)
LYMPHOCYTES NFR BLD AUTO: 15.9 %
MCH RBC QN AUTO: 26.7 PG (ref 26–34)
MCHC RBC AUTO-ENTMCNC: 32.5 G/DL (ref 31–37)
MCV RBC AUTO: 82.3 FL
MONOCYTES # BLD AUTO: 0.4 X10(3) UL (ref 0.1–1)
MONOCYTES NFR BLD AUTO: 4.5 %
NEUTROPHILS # BLD AUTO: 6.9 X10 (3) UL (ref 1.5–7.7)
NEUTROPHILS # BLD AUTO: 6.9 X10(3) UL (ref 1.5–7.7)
NEUTROPHILS NFR BLD AUTO: 77.2 %
PLATELET # BLD AUTO: 283 10(3)UL (ref 150–450)
RBC # BLD AUTO: 3.67 X10(6)UL
RH BLOOD TYPE: POSITIVE
SARS-COV-2 RNA RESP QL NAA+PROBE: NOT DETECTED
WBC # BLD AUTO: 8.9 X10(3) UL (ref 4–11)

## 2022-11-21 PROCEDURE — 36415 COLL VENOUS BLD VENIPUNCTURE: CPT

## 2022-11-21 PROCEDURE — 86901 BLOOD TYPING SEROLOGIC RH(D): CPT

## 2022-11-21 PROCEDURE — 86900 BLOOD TYPING SEROLOGIC ABO: CPT

## 2022-11-21 PROCEDURE — 85025 COMPLETE CBC W/AUTO DIFF WBC: CPT

## 2022-11-21 PROCEDURE — 86850 RBC ANTIBODY SCREEN: CPT

## 2022-11-22 ENCOUNTER — HOSPITAL ENCOUNTER (INPATIENT)
Facility: HOSPITAL | Age: 27
LOS: 2 days | Discharge: HOME | End: 2022-11-24
Attending: OBSTETRICS & GYNECOLOGY | Admitting: OBSTETRICS & GYNECOLOGY
Payer: COMMERCIAL

## 2022-11-22 ENCOUNTER — ANESTHESIA (OUTPATIENT)
Dept: OBGYN UNIT | Facility: HOSPITAL | Age: 27
End: 2022-11-22
Payer: COMMERCIAL

## 2022-11-22 ENCOUNTER — ANESTHESIA EVENT (OUTPATIENT)
Dept: OBGYN UNIT | Facility: HOSPITAL | Age: 27
End: 2022-11-22
Payer: COMMERCIAL

## 2022-11-22 PROBLEM — Z34.90 PREGNANCY: Status: ACTIVE | Noted: 2022-11-22

## 2022-11-22 PROCEDURE — S0028 INJECTION, FAMOTIDINE, 20 MG: HCPCS | Performed by: OBSTETRICS & GYNECOLOGY

## 2022-11-22 PROCEDURE — 88307 TISSUE EXAM BY PATHOLOGIST: CPT | Performed by: OBSTETRICS & GYNECOLOGY

## 2022-11-22 RX ORDER — PROCHLORPERAZINE EDISYLATE 5 MG/ML
5 INJECTION INTRAMUSCULAR; INTRAVENOUS ONCE AS NEEDED
Status: ACTIVE | OUTPATIENT
Start: 2022-11-22 | End: 2022-11-22

## 2022-11-22 RX ORDER — SODIUM CHLORIDE, SODIUM LACTATE, POTASSIUM CHLORIDE, CALCIUM CHLORIDE 600; 310; 30; 20 MG/100ML; MG/100ML; MG/100ML; MG/100ML
INJECTION, SOLUTION INTRAVENOUS CONTINUOUS
Status: DISCONTINUED | OUTPATIENT
Start: 2022-11-22 | End: 2022-11-24

## 2022-11-22 RX ORDER — SODIUM CHLORIDE, SODIUM LACTATE, POTASSIUM CHLORIDE, CALCIUM CHLORIDE 600; 310; 30; 20 MG/100ML; MG/100ML; MG/100ML; MG/100ML
125 INJECTION, SOLUTION INTRAVENOUS CONTINUOUS
Status: DISCONTINUED | OUTPATIENT
Start: 2022-11-22 | End: 2022-11-22 | Stop reason: HOSPADM

## 2022-11-22 RX ORDER — MORPHINE SULFATE 1 MG/ML
INJECTION, SOLUTION EPIDURAL; INTRATHECAL; INTRAVENOUS AS NEEDED
Status: DISCONTINUED | OUTPATIENT
Start: 2022-11-22 | End: 2022-11-22 | Stop reason: SURG

## 2022-11-22 RX ORDER — FAMOTIDINE 10 MG/ML
20 INJECTION, SOLUTION INTRAVENOUS ONCE
Status: COMPLETED | OUTPATIENT
Start: 2022-11-22 | End: 2022-11-22

## 2022-11-22 RX ORDER — HYDROMORPHONE HYDROCHLORIDE 1 MG/ML
0.2 INJECTION, SOLUTION INTRAMUSCULAR; INTRAVENOUS; SUBCUTANEOUS EVERY 2 HOUR PRN
Status: DISCONTINUED | OUTPATIENT
Start: 2022-11-22 | End: 2022-11-24

## 2022-11-22 RX ORDER — AMMONIA INHALANTS 0.04 G/.3ML
0.3 INHALANT RESPIRATORY (INHALATION) AS NEEDED
Status: DISCONTINUED | OUTPATIENT
Start: 2022-11-22 | End: 2022-11-24

## 2022-11-22 RX ORDER — HYDROCODONE BITARTRATE AND ACETAMINOPHEN 7.5; 325 MG/1; MG/1
1 TABLET ORAL EVERY 6 HOURS PRN
Status: ACTIVE | OUTPATIENT
Start: 2022-11-22 | End: 2022-11-23

## 2022-11-22 RX ORDER — ACETAMINOPHEN 500 MG
1000 TABLET ORAL EVERY 6 HOURS
Status: DISCONTINUED | OUTPATIENT
Start: 2022-11-22 | End: 2022-11-24

## 2022-11-22 RX ORDER — KETOROLAC TROMETHAMINE 30 MG/ML
30 INJECTION, SOLUTION INTRAMUSCULAR; INTRAVENOUS EVERY 6 HOURS
Status: DISPENSED | OUTPATIENT
Start: 2022-11-22 | End: 2022-11-23

## 2022-11-22 RX ORDER — ACETAMINOPHEN 500 MG
1000 TABLET ORAL ONCE
Status: COMPLETED | OUTPATIENT
Start: 2022-11-22 | End: 2022-11-22

## 2022-11-22 RX ORDER — HYDROMORPHONE HYDROCHLORIDE 1 MG/ML
0.4 INJECTION, SOLUTION INTRAMUSCULAR; INTRAVENOUS; SUBCUTANEOUS
Status: ACTIVE | OUTPATIENT
Start: 2022-11-22 | End: 2022-11-23

## 2022-11-22 RX ORDER — METOCLOPRAMIDE 10 MG/1
10 TABLET ORAL ONCE
Status: COMPLETED | OUTPATIENT
Start: 2022-11-22 | End: 2022-11-22

## 2022-11-22 RX ORDER — ONDANSETRON 2 MG/ML
4 INJECTION INTRAMUSCULAR; INTRAVENOUS EVERY 6 HOURS PRN
Status: DISCONTINUED | OUTPATIENT
Start: 2022-11-22 | End: 2022-11-22 | Stop reason: HOSPADM

## 2022-11-22 RX ORDER — HYDROMORPHONE HYDROCHLORIDE 2 MG/1
2 TABLET ORAL EVERY 4 HOURS PRN
Status: DISCONTINUED | OUTPATIENT
Start: 2022-11-22 | End: 2022-11-24

## 2022-11-22 RX ORDER — ONDANSETRON 2 MG/ML
INJECTION INTRAMUSCULAR; INTRAVENOUS AS NEEDED
Status: DISCONTINUED | OUTPATIENT
Start: 2022-11-22 | End: 2022-11-22 | Stop reason: SURG

## 2022-11-22 RX ORDER — METOCLOPRAMIDE HYDROCHLORIDE 5 MG/ML
10 INJECTION INTRAMUSCULAR; INTRAVENOUS ONCE
Status: COMPLETED | OUTPATIENT
Start: 2022-11-22 | End: 2022-11-22

## 2022-11-22 RX ORDER — ONDANSETRON 2 MG/ML
4 INJECTION INTRAMUSCULAR; INTRAVENOUS ONCE AS NEEDED
Status: ACTIVE | OUTPATIENT
Start: 2022-11-22 | End: 2022-11-22

## 2022-11-22 RX ORDER — NALOXONE HYDROCHLORIDE 0.4 MG/ML
0.08 INJECTION, SOLUTION INTRAMUSCULAR; INTRAVENOUS; SUBCUTANEOUS
Status: ACTIVE | OUTPATIENT
Start: 2022-11-22 | End: 2022-11-23

## 2022-11-22 RX ORDER — HYDROCODONE BITARTRATE AND ACETAMINOPHEN 7.5; 325 MG/1; MG/1
2 TABLET ORAL EVERY 6 HOURS PRN
Status: ACTIVE | OUTPATIENT
Start: 2022-11-22 | End: 2022-11-23

## 2022-11-22 RX ORDER — DIPHENHYDRAMINE HYDROCHLORIDE 50 MG/ML
25 INJECTION INTRAMUSCULAR; INTRAVENOUS ONCE AS NEEDED
Status: ACTIVE | OUTPATIENT
Start: 2022-11-22 | End: 2022-11-22

## 2022-11-22 RX ORDER — DEXTROSE, SODIUM CHLORIDE, SODIUM LACTATE, POTASSIUM CHLORIDE, AND CALCIUM CHLORIDE 5; .6; .31; .03; .02 G/100ML; G/100ML; G/100ML; G/100ML; G/100ML
INJECTION, SOLUTION INTRAVENOUS CONTINUOUS
Status: DISCONTINUED | OUTPATIENT
Start: 2022-11-22 | End: 2022-11-24

## 2022-11-22 RX ORDER — LIDOCAINE HYDROCHLORIDE 10 MG/ML
INJECTION, SOLUTION EPIDURAL; INFILTRATION; INTRACAUDAL; PERINEURAL AS NEEDED
Status: DISCONTINUED | OUTPATIENT
Start: 2022-11-22 | End: 2022-11-22 | Stop reason: SURG

## 2022-11-22 RX ORDER — CHOLECALCIFEROL (VITAMIN D3) 25 MCG
1 TABLET,CHEWABLE ORAL DAILY
Status: DISCONTINUED | OUTPATIENT
Start: 2022-11-22 | End: 2022-11-24

## 2022-11-22 RX ORDER — SIMETHICONE 80 MG
80 TABLET,CHEWABLE ORAL 3 TIMES DAILY PRN
Status: DISCONTINUED | OUTPATIENT
Start: 2022-11-22 | End: 2022-11-24

## 2022-11-22 RX ORDER — BUPIVACAINE HYDROCHLORIDE 7.5 MG/ML
INJECTION, SOLUTION INTRASPINAL AS NEEDED
Status: DISCONTINUED | OUTPATIENT
Start: 2022-11-22 | End: 2022-11-22 | Stop reason: SURG

## 2022-11-22 RX ORDER — KETOROLAC TROMETHAMINE 30 MG/ML
30 INJECTION, SOLUTION INTRAMUSCULAR; INTRAVENOUS ONCE AS NEEDED
Status: COMPLETED | OUTPATIENT
Start: 2022-11-22 | End: 2022-11-22

## 2022-11-22 RX ORDER — DOCUSATE SODIUM 100 MG/1
100 CAPSULE, LIQUID FILLED ORAL
Status: DISCONTINUED | OUTPATIENT
Start: 2022-11-22 | End: 2022-11-24

## 2022-11-22 RX ORDER — ACETAMINOPHEN 325 MG/1
650 TABLET ORAL EVERY 6 HOURS PRN
Status: ACTIVE | OUTPATIENT
Start: 2022-11-22 | End: 2022-11-23

## 2022-11-22 RX ORDER — TRISODIUM CITRATE DIHYDRATE AND CITRIC ACID MONOHYDRATE 500; 334 MG/5ML; MG/5ML
30 SOLUTION ORAL ONCE
Status: COMPLETED | OUTPATIENT
Start: 2022-11-22 | End: 2022-11-22

## 2022-11-22 RX ORDER — DIPHENHYDRAMINE HCL 25 MG
25 CAPSULE ORAL EVERY 4 HOURS PRN
Status: ACTIVE | OUTPATIENT
Start: 2022-11-22 | End: 2022-11-23

## 2022-11-22 RX ORDER — CEFAZOLIN SODIUM IN 0.9 % NACL 3 G/100 ML
3 INTRAVENOUS SOLUTION, PIGGYBACK (ML) INTRAVENOUS ONCE
Status: DISCONTINUED | OUTPATIENT
Start: 2022-11-22 | End: 2022-11-22 | Stop reason: HOSPADM

## 2022-11-22 RX ORDER — ONDANSETRON 2 MG/ML
4 INJECTION INTRAMUSCULAR; INTRAVENOUS EVERY 6 HOURS PRN
Status: DISCONTINUED | OUTPATIENT
Start: 2022-11-22 | End: 2022-11-24

## 2022-11-22 RX ORDER — HYDROMORPHONE HYDROCHLORIDE 1 MG/ML
0.6 INJECTION, SOLUTION INTRAMUSCULAR; INTRAVENOUS; SUBCUTANEOUS
Status: ACTIVE | OUTPATIENT
Start: 2022-11-22 | End: 2022-11-23

## 2022-11-22 RX ORDER — DIPHENHYDRAMINE HYDROCHLORIDE 50 MG/ML
12.5 INJECTION INTRAMUSCULAR; INTRAVENOUS EVERY 4 HOURS PRN
Status: DISPENSED | OUTPATIENT
Start: 2022-11-22 | End: 2022-11-23

## 2022-11-22 RX ORDER — ENOXAPARIN SODIUM 100 MG/ML
40 INJECTION SUBCUTANEOUS DAILY
Status: DISCONTINUED | OUTPATIENT
Start: 2022-11-22 | End: 2022-11-23

## 2022-11-22 RX ORDER — BISACODYL 10 MG
10 SUPPOSITORY, RECTAL RECTAL ONCE AS NEEDED
Status: DISCONTINUED | OUTPATIENT
Start: 2022-11-22 | End: 2022-11-24

## 2022-11-22 RX ORDER — IBUPROFEN 600 MG/1
600 TABLET ORAL EVERY 6 HOURS
Status: DISCONTINUED | OUTPATIENT
Start: 2022-11-23 | End: 2022-11-23

## 2022-11-22 RX ORDER — NALBUPHINE HCL 10 MG/ML
2.5 AMPUL (ML) INJECTION EVERY 4 HOURS PRN
Status: ACTIVE | OUTPATIENT
Start: 2022-11-22 | End: 2022-11-23

## 2022-11-22 RX ORDER — FAMOTIDINE 20 MG/1
20 TABLET, FILM COATED ORAL ONCE
Status: COMPLETED | OUTPATIENT
Start: 2022-11-22 | End: 2022-11-22

## 2022-11-22 RX ORDER — PHENYLEPHRINE HCL 10 MG/ML
VIAL (ML) INJECTION AS NEEDED
Status: DISCONTINUED | OUTPATIENT
Start: 2022-11-22 | End: 2022-11-22 | Stop reason: SURG

## 2022-11-22 RX ORDER — GABAPENTIN 300 MG/1
300 CAPSULE ORAL EVERY 8 HOURS PRN
Status: DISCONTINUED | OUTPATIENT
Start: 2022-11-22 | End: 2022-11-24

## 2022-11-22 RX ORDER — HALOPERIDOL 5 MG/ML
0.5 INJECTION INTRAMUSCULAR ONCE AS NEEDED
Status: ACTIVE | OUTPATIENT
Start: 2022-11-22 | End: 2022-11-22

## 2022-11-22 RX ORDER — DEXAMETHASONE SODIUM PHOSPHATE 4 MG/ML
VIAL (ML) INJECTION AS NEEDED
Status: DISCONTINUED | OUTPATIENT
Start: 2022-11-22 | End: 2022-11-22 | Stop reason: SURG

## 2022-11-22 RX ORDER — NALBUPHINE HCL 10 MG/ML
2.5 AMPUL (ML) INJECTION
Status: DISCONTINUED | OUTPATIENT
Start: 2022-11-22 | End: 2022-11-24

## 2022-11-22 RX ADMIN — MORPHINE SULFATE 0.3 MG: 1 INJECTION, SOLUTION EPIDURAL; INTRATHECAL; INTRAVENOUS at 07:50:00

## 2022-11-22 RX ADMIN — LIDOCAINE HYDROCHLORIDE 3 ML: 10 INJECTION, SOLUTION EPIDURAL; INFILTRATION; INTRACAUDAL; PERINEURAL at 07:46:00

## 2022-11-22 RX ADMIN — BUPIVACAINE HYDROCHLORIDE 1.6 ML: 7.5 INJECTION, SOLUTION INTRASPINAL at 07:50:00

## 2022-11-22 RX ADMIN — DEXAMETHASONE SODIUM PHOSPHATE 4 MG: 4 MG/ML VIAL (ML) INJECTION at 08:18:00

## 2022-11-22 RX ADMIN — SODIUM CHLORIDE, SODIUM LACTATE, POTASSIUM CHLORIDE, CALCIUM CHLORIDE: 600; 310; 30; 20 INJECTION, SOLUTION INTRAVENOUS at 08:49:00

## 2022-11-22 RX ADMIN — PHENYLEPHRINE HCL 100 MCG: 10 MG/ML VIAL (ML) INJECTION at 08:30:00

## 2022-11-22 RX ADMIN — SODIUM CHLORIDE, SODIUM LACTATE, POTASSIUM CHLORIDE, CALCIUM CHLORIDE: 600; 310; 30; 20 INJECTION, SOLUTION INTRAVENOUS at 07:41:00

## 2022-11-22 RX ADMIN — ONDANSETRON 4 MG: 2 INJECTION INTRAMUSCULAR; INTRAVENOUS at 08:18:00

## 2022-11-22 RX ADMIN — SODIUM CHLORIDE, SODIUM LACTATE, POTASSIUM CHLORIDE, CALCIUM CHLORIDE: 600; 310; 30; 20 INJECTION, SOLUTION INTRAVENOUS at 08:21:00

## 2022-11-22 NOTE — PLAN OF CARE
Problem: PAIN - ADULT  Goal: Verbalizes/displays adequate comfort level or patient's stated pain goal  Description: INTERVENTIONS:  - Encourage pt to monitor pain and request assistance  - Assess pain using appropriate pain scale  - Administer analgesics based on type and severity of pain and evaluate response  - Implement non-pharmacological measures as appropriate and evaluate response  - Consider cultural and social influences on pain and pain management  - Manage/alleviate anxiety  - Utilize distraction and/or relaxation techniques  - Monitor for opioid side effects  - Notify MD/LIP if interventions unsuccessful or patient reports new pain  - Anticipate increased pain with activity and pre-medicate as appropriate  Outcome: Progressing     Problem: ANXIETY  Goal: Will report anxiety at manageable levels  Description: INTERVENTIONS:  - Administer medication as ordered  - Teach and rehearse alternative coping skills  - Provide emotional support with 1:1 interaction with staff  Outcome: Progressing     Problem: Patient Centered Care  Goal: Patient preferences are identified and integrated in the patient's plan of care  Description: Interventions:  - What would you like us to know as we care for you?  Were having a girl named [place]  - Provide timely, complete, and accurate information to patient/family  - Incorporate patient and family knowledge, values, beliefs, and cultural backgrounds into the planning and delivery of care  - Encourage patient/family to participate in care and decision-making at the level they choose  - Honor patient and family perspectives and choices  Outcome: Progressing     Problem: Patient/Family Goals  Goal: Patient/Family Long Term Goal  Description: Patient's Long Term Goal: Patient's Long Term Goal: Uncomplicated Delivery     Interventions:  - Assessment/Monitoring  - Induction/Augmentation per protocol and Provider order  - C/S per protocol and Provider order   - Education  - Intervention per protocol and Provider order with education   - Involve patient in POC  - See additional Care Plan goals for specific interventions   Outcome: Progressing  Goal: Patient/Family Short Term Goal  Description: Patient's Short Term Goal: Patient's Short Term Goal: Comfort and Pain Control     Interventions:   - Non Pharmacological pain intervention   - IV/IM and Epidural pain medication per Provider order and patient request  - Education  - Involve Patient in POC   - See additional Care Plan goals for specific interventions   Outcome: Progressing     Problem: POSTPARTUM  Goal: Long Term Goal:Experiences normal postpartum course  Description: INTERVENTIONS:  - Assess and monitor vital signs and lab values. - Assess fundus and lochia. - Provide ice/sitz baths for perineum discomfort. - Monitor healing of incision/episiotomy/laceration, and assess for signs and symptoms of infection and hematoma. - Assess bladder function and monitor for bladder distention.  - Provide/instruct/assist with pericare as needed. - Provide VTE prophylaxis as needed. - Monitor bowel function.  - Encourage ambulation and provide assistance as needed. - Assess and monitor emotional status and provide social service/psych resources as needed. - Utilize standard precautions and use personal protective equipment as indicated. Ensure aseptic care of all intravenous lines and invasive tubes/drains.  - Obtain immunization and exposure to communicable diseases history. Outcome: Progressing  Goal: Optimize infant feeding at the breast  Description: INTERVENTIONS:  - Initiate breast feeding within first hour after birth. - Monitor effectiveness of current breast feeding efforts. - Assess support systems available to mother/family.  - Identify cultural beliefs/practices regarding lactation, letdown techniques, maternal food preferences.   - Assess mother's knowledge and previous experience with breast feeding.  - Provide information as needed about early infant feeding cues (e.g., rooting, lip smacking, sucking fingers/hand) versus late cue of crying.  - Discuss/demonstrate breast feeding aids (e.g., infant sling, nursing footstool/pillows, and breast pumps). - Encourage mother/other family members to express feelings/concerns, and actively listen. - Educate father/SO about benefits of breast feeding and how to manage common lactation challenges. - Recommend avoidance of specific medications or substances incompatible with breast feeding.  - Assess and monitor for signs of nipple pain/trauma. - Instruct and provide assistance with proper latch. - Review techniques for milk expression (breast pumping) and storage of breast milk. Provide pumping equipment/supplies, instructions and assistance, as needed. - Encourage rooming-in and breast feeding on demand.  - Encourage skin-to-skin contact. - Provide LC support as needed. - Assess for and manage engorgement. - Provide breast feeding education handouts and information on community breast feeding support. Outcome: Progressing  Goal: Establishment of adequate milk supply with medication/procedure interruptions  Description: INTERVENTIONS:  - Review techniques for milk expression (breast pumping). - Provide pumping equipment/supplies, instructions, and assistance until it is safe to breastfeed infant. Outcome: Progressing  Goal: Experiences normal breast weaning course  Description: INTERVENTIONS:  - Assess for and manage engorgement. - Instruct on breast care. - Provide comfort measures. Outcome: Progressing  Goal: Appropriate maternal -  bonding  Description: INTERVENTIONS:  - Assess caregiver- interactions. - Assess caregiver's emotional status and coping mechanisms. - Encourage caregiver to participate in  daily care. - Assess support systems available to mother/family.  - Provide /case management support as needed.   Outcome: Progressing     Problem: BIRTH - VAGINAL/ SECTION  Goal: Fetal and maternal status remain reassuring during the birth process  Description: INTERVENTIONS:  - Monitor vital signs  - Monitor fetal heart rate  - Monitor uterine activity  - Monitor labor progression (vaginal delivery)  - DVT prophylaxis (C/S delivery)  - Surgical antibiotic prophylaxis (C/S delivery)  Outcome: Completed

## 2022-11-22 NOTE — PROGRESS NOTES
Received patient into room 357 via cart . Bedside shift report received from [employer]. Pt transferred to bed. Bed in lowest and locked position. Side rails up x2. VSS. IV site unremarkable. Baby present in open crib. ID bands matched with L&D RN. Patient and family oriented to unit, room and call light within reach. Safety measures in place, POC followed. Will continue to monitor per protocol.

## 2022-11-22 NOTE — PROGRESS NOTES
Pt is a 32year old female admitted to TR5/TR5-A. Patient presents with:  Scheduled      Pt is [id number] 40w3d intra-uterine pregnancy. History obtained, consents signed. Oriented to room, staff, and plan of care.

## 2022-11-22 NOTE — OPERATIVE REPORT
[facility]    PATIENT'S NAME: Karen Chino   ATTENDING PHYSICIAN: Anatoliy Dill MD   OPERATING PHYSICIAN: Anatoliy Dill MD   PATIENT ACCOUNT#:   [id number]    LOCATION:  [address] [place] [address] RECORD #:   [id number]       YOB: 1995  ADMISSION DATE:       2022      OPERATION DATE:  2022    OPERATIVE REPORT      PREOPERATIVE DIAGNOSIS:  Intrauterine pregnancy at 40+ weeks with history of prior  section. POSTOPERATIVE DIAGNOSIS:  Intrauterine pregnancy at 40+ weeks with history of prior  section. PROCEDURE:  Low transverse  section. ASSISTANT SURGEON:  Geno Lopez MD.  Dr. Ailyn Jansen was vital for adequate visualization and assistance in execution of the  section with visualization as well as retraction. NEONATOLOGIST:  Dr. Magdiel Sood was present. ANESTHESIA:  Spinal anesthesia with Dr. Russell Wong. COMPLICATIONS:  None. INDICATIONS:  The patient is a 60-year-old G4, P2-1-0-2 at 40 and 3 7 weeks, who had presented for repeat  section. Patient had been hoping for vaginal birth after , but spontaneous labor did not occur. Risks, benefits, and alternatives were discussed with the patient. Patient was agreeable to proceed. FINDINGS:  Normal uterus, tubes, and ovaries. Female infant, 8 pounds even, delivered at 8:18 a.m., Apgars 8 and 9. Quantitative blood loss 215. OPERATIVE TECHNIQUE:  Spinal anesthesia was given and patient was prepped and draped in the usual manner in dorsal supine position with leftward tilt. Sorto catheter was placed. Incision was made over her previous Pfannenstiel skin incision, carried down to the underlying fascia. Fascia was nicked in midline and extended bluntly. Rectus muscles dissected off bluntly and sharply. Rectus muscles  in midline. Peritoneal cavity entered bluntly and extended with Metzenbaum scissors.   Spike self-retaining retractor was placed into the abdominal cavity with careful check to make sure no bowel was trapped. A bladder flap was created and the bladder was pushed down. Low transverse uterine incision made with scalpel and extended with bandage scissors, amniotomy clear fluid. Head was delivered through the incision. Mouth and nares bulb suctioned on the field. Body was delivered. Cord clamped x2 after 30 seconds and cut and brought to the waiting warmer where Dr. Johann Smith was present. Placenta was delivered. Uterus was left in place. The uterus was wiped and cleared of all clots and debris. Ring was used to open the cervix. Uterine incision closed in 2 layers with 0 chromic in a running locked fashion and second suture used for imbricating layer. A figure-of-eight suture of 2-0 chromic was used for hemostasis. The gutters were cleared of all clots and debris. Normal-appearing ovaries. Incision was found to be intact. Peritoneum reapproximated with 2-0 chromic in a running fashion. Muscles reapproximated with interrupted sutures of 2-0 chromic. Fascia closed with 0 Vicryl in a running fashion. Subcutaneous adipose reapproximated with 2-0 plain gut. Skin was closed with 4-0 Monocryl in a subcuticular fashion. Steri-Strips were placed. Patient received 3 g of Ancef at the start of the procedure.     Dictated By Cindi Howard MD  d: 11/22/2022 12:40:19  t: 11/22/2022 16:47:03  Job [id number]  XF/

## 2022-11-22 NOTE — PROGRESS NOTES
Patient transferred to mother/baby room 357 per cart in stable condition with baby and personal belongings. Accompanied by care partner and staff. Report given to mother/baby RN.

## 2022-11-22 NOTE — ANESTHESIA PROCEDURE NOTES
Spinal Block    Date/Time: 11/22/2022 7:46 AM  Performed by: Jazmine Arriaga MD  Authorized by: Jazmine Arriaga MD       General Information and Staff    Start Time:  11/22/2022 7:46 AM  End Time:  11/22/2022 7:50 AM  Anesthesiologist:  Jazmine Arriaga MD  Performed by:   Anesthesiologist  Patient Location:  OB  Preanesthetic Checklist: patient identified, IV checked, risks and benefits discussed, monitors and equipment checked, pre-op evaluation, timeout performed, anesthesia consent and sterile technique used      Procedure Details    Patient Position:  Sitting  Prep: ChloraPrep and patient draped    Monitoring:  Cardiac monitor, heart rate and continuous pulse ox  Approach:  Midline  Location:  L3-4  Injection Technique:  Single-shot    Needle    Needle Type:  Quincke  Needle Gauge:  22 G  Needle Length:  6 in    Assessment    Sensory Level:  T4  Events: clear CSF, CSF aspirated, well tolerated and blood negative      Additional Comments

## 2022-11-22 NOTE — BRIEF OP NOTE
[facility]     Section Delivery Note    Polina Diaz Patient Status:  Inpatient    3/10/1995 MRN [id number]   Location [address] Attending Cinthya Madrid MD   Hosp Day # 0 PCP Raul Sorto MD     Pre Op Diagnosis: IUP at 40 weeks and history of prior  section  Post Op Diagnosis: same as pre-op  Procedure:   Surgical/Procedural Cases on this Admission     Case IDs Date Procedure Surgeon Location Status    [id number] 22  SECTION Cinthya Madrid MD [address] [place] [address] L+D [address] [place]        [facility]    Surgeon:  Gerson Ha MD  Assistant Surgeon:  Sylvia Woodruff MD   Anesthesia: spinal  Complications: none  Neonatologist Present: yes  Findings: normal uterus, normal tubes and normal ovaries    Delivery     Infant  Date of Delivery: 2022   Time of Delivery: 8:18 AM  Delivery Type: Caesarean Section    Infant Sex/Birthweight: female 8 lb 0.4 oz (3.64 kg)     Presentation Vertex [1]  Position          Apgars:  1 minute: 8               5 minutes: 9                        10 minutes:      Placenta  Date/Time of Delivery: 2022  8:19 AM   Delivery: manual extraction  Placenta to Pathology: yes  Cord Gases Submitted: no  Cord Blood Collection: no  Cord Tissue Collection:no   Cord Complications: none  Sponge and Needle Counts:  Verified: yes     Dictation Number:   [id number]    Input/Output   Quantitative Blood Loss (mL) Kong Lane MD  2022  9:03 AM [phone]

## 2022-11-23 LAB
BASOPHILS # BLD AUTO: 0.01 X10(3) UL (ref 0–0.2)
BASOPHILS NFR BLD AUTO: 0.1 %
DEPRECATED RDW RBC AUTO: 45.5 FL (ref 35.1–46.3)
EOSINOPHIL # BLD AUTO: 0.06 X10(3) UL (ref 0–0.7)
EOSINOPHIL NFR BLD AUTO: 0.8 %
ERYTHROCYTE [DISTWIDTH] IN BLOOD BY AUTOMATED COUNT: 14.9 % (ref 11–15)
HCT VFR BLD AUTO: 23.6 %
HGB BLD-MCNC: 7.4 G/DL
IMM GRANULOCYTES # BLD AUTO: 0.05 X10(3) UL (ref 0–1)
IMM GRANULOCYTES NFR BLD: 0.7 %
LYMPHOCYTES # BLD AUTO: 1.43 X10(3) UL (ref 1–4)
LYMPHOCYTES NFR BLD AUTO: 18.6 %
MCH RBC QN AUTO: 26.3 PG (ref 26–34)
MCHC RBC AUTO-ENTMCNC: 31.4 G/DL (ref 31–37)
MCV RBC AUTO: 84 FL
MONOCYTES # BLD AUTO: 0.53 X10(3) UL (ref 0.1–1)
MONOCYTES NFR BLD AUTO: 6.9 %
NEUTROPHILS # BLD AUTO: 5.61 X10 (3) UL (ref 1.5–7.7)
NEUTROPHILS # BLD AUTO: 5.61 X10(3) UL (ref 1.5–7.7)
NEUTROPHILS NFR BLD AUTO: 72.9 %
PLATELET # BLD AUTO: 205 10(3)UL (ref 150–450)
RBC # BLD AUTO: 2.81 X10(6)UL
WBC # BLD AUTO: 7.7 X10(3) UL (ref 4–11)

## 2022-11-23 PROCEDURE — 85025 COMPLETE CBC W/AUTO DIFF WBC: CPT | Performed by: OBSTETRICS & GYNECOLOGY

## 2022-11-23 RX ORDER — IBUPROFEN 600 MG/1
600 TABLET ORAL EVERY 6 HOURS
Status: DISCONTINUED | OUTPATIENT
Start: 2022-11-23 | End: 2022-11-24

## 2022-11-23 RX ORDER — ENOXAPARIN SODIUM 100 MG/ML
40 INJECTION SUBCUTANEOUS EVERY 24 HOURS
Status: DISCONTINUED | OUTPATIENT
Start: 2022-11-23 | End: 2022-11-24

## 2022-11-23 NOTE — LACTATION NOTE
This note was copied from a baby's chart. Introduced pt to lactation services. Reviewed importance of skin to skin,  behavior in first 24 hours, feeding frequency, latch and positioning of . Demonstrated breast massage and hand expression of colostrum prior to latching infant. Encouraged to call for latch assistance as needed.       LACTATION NOTE - INFANT    Evaluation Type  Evaluation Type: Inpatient         Feeding Assessment  Summary Current Feeding: Adlib;Breastfeeding exclusively  Breastfeeding Assessment: Sleepy infant, recently fed

## 2022-11-23 NOTE — CM/SW NOTE
SW met with the pt at bedside. The pt. Confirmed she has supportive family, but is still grieving her son who she lost at 25 weeks. SW provided grief counseling resources and SHARE group information. The pt. Is also inquiring about the remains of her son (20 week loss) as he wasn't ever picked up from the [facility]. SW was unable to answer that question but would attempt to find that answer and get back to her. She is aware and agreeable and receptive to SW visit and resources. Plan: home when medically cleared.       Saleem Smart, [facility] ext [phone]

## 2022-11-23 NOTE — LACTATION NOTE
LACTATION NOTE - MOTHER      Evaluation Type: Inpatient    Problems identified  Problems identified: Knowledge deficit    Maternal history  Maternal history: Caesarean section;Depression;Obesity  Other/comment: ADHD, asthma    Breastfeeding goal  Breastfeeding goal: To maintain breast milk feeding per patient goal    Maternal Assessment  Bilateral Breasts: Soft;Symmetrical;Elastic  Bilateral Nipples: Slightly everted/short;Colostrum easily expressed  Prior breastfeeding experience (comment below): Multip; Successful  Prior BF experience: comment: 12-15 months  Breastfeeding Assistance: Breastfeeding assistance provided with permission    Pain assessment  Location/Comment: denies  Treatment of Sore Nipples: Lanolin;Expressed breast milk    Guidelines for use of:  Current use of pump[de-identified] not indicated  Other (comment): Reports that breastfeeding is going well.

## 2022-11-23 NOTE — ANESTHESIA POST-OP FOLLOW-UP NOTE
Ms. Lei Wood is POD#1 after her  performed under neuraxial anesthesia. Denies headache, back pain, or residual LE weakness/numbness. Spinal placement site examined, no erythema, hematoma, or tenderness to palpation. Pain score is 2/10. No further anesthesia management needed at this time. Doing well on oral pain meds. All anesthetic questions answered.      Tanna Casiano MD

## 2022-11-24 VITALS
HEIGHT: 64 IN | OXYGEN SATURATION: 97 % | BODY MASS INDEX: 47.63 KG/M2 | RESPIRATION RATE: 17 BRPM | TEMPERATURE: 98 F | DIASTOLIC BLOOD PRESSURE: 66 MMHG | HEART RATE: 79 BPM | SYSTOLIC BLOOD PRESSURE: 135 MMHG | WEIGHT: 279 LBS

## 2022-11-24 PROBLEM — Z34.90 PREGNANCY: Status: RESOLVED | Noted: 2022-11-22 | Resolved: 2022-11-24

## 2022-11-24 RX ORDER — GABAPENTIN 300 MG/1
300 CAPSULE ORAL EVERY 8 HOURS PRN
Qty: 21 CAPSULE | Refills: 0 | Status: SHIPPED | OUTPATIENT
Start: 2022-11-24

## 2022-11-24 RX ORDER — HYDROMORPHONE HYDROCHLORIDE 2 MG/1
2 TABLET ORAL EVERY 4 HOURS PRN
Qty: 16 TABLET | Refills: 0 | Status: SHIPPED | OUTPATIENT
Start: 2022-11-24 | End: 2022-11-24

## 2022-11-24 RX ORDER — ACETAMINOPHEN 500 MG
1000 TABLET ORAL EVERY 6 HOURS
Qty: 30 TABLET | Refills: 0 | Status: SHIPPED | OUTPATIENT
Start: 2022-11-24

## 2022-11-24 RX ORDER — IBUPROFEN 600 MG/1
600 TABLET ORAL EVERY 6 HOURS
Qty: 30 TABLET | Refills: 1 | Status: SHIPPED | OUTPATIENT
Start: 2022-11-24

## 2022-11-24 RX ORDER — CHOLECALCIFEROL (VITAMIN D3) 25 MCG
1 TABLET,CHEWABLE ORAL DAILY
Status: DISCONTINUED | OUTPATIENT
Start: 2022-11-24 | End: 2022-11-24

## 2022-11-24 NOTE — DISCHARGE SUMMARY
[facility]  Discharge Summary    Shannan Grewal Patient Status:  Inpatient    3/10/1995 MRN [id number]   Location [facility] 3SE Attending Lizette Mariscal MD   Hosp Day # 2 PCP Tasia Vela MD     Date of Admission: 2022    Date of Discharge: 2022    Admitting Diagnosis: REPEAT CS  Pregnancy    Discharge Diagnosis: Patient Active Problem List:     BMI 40.0-44.9, adult ([address]     Bronchospasm     Depression with anxiety      Reason for Admission: Planned delivery by     Hospital Course: Underwent delivery by repeat , PP Hgb 7.4 g/dL. Procedures:     Complications: None    Disposition: Home or Self Care    Discharge Condition: Good    Discharge Medications: Current Discharge Medication List    START taking these medications    gabapentin 300 MG Oral Cap  Take 1 capsule (300 mg total) by mouth every 8 (eight) hours as needed (For breakthrough moderate pain). Qty: 21 capsule Refills: 0    HYDROmorphone 2 MG Oral Tab  Take 1 tablet (2 mg total) by mouth every 4 (four) hours as needed. Qty: 16 tablet Refills: 0      CONTINUE these medications which have CHANGED    acetaminophen 500 MG Oral Tab  Take 2 tablets (1,000 mg total) by mouth every 6 (six) hours. Qty: 30 tablet Refills: 0      CONTINUE these medications which have NOT CHANGED    Ferrous Sulfate-C-Folic Acid (FERROUS SULFATE-C-FA ER OR)  Take 1 tablet by mouth As Directed. albuterol 108 (90 Base) MCG/ACT Inhalation Aero Soln  Inhale 2 puffs into the lungs every 4 (four) hours as needed for Wheezing or Shortness of Breath. Qty: 1 each Refills: 3  Associated Diagnoses:Bronchospasm    Prenatal Multivit-Min-Fe-FA (PRENATAL OR)  Take 1 tablet by mouth daily. Associated Diagnoses:Encounter for routine screening for malformation using ultrasonics; Late prenatal care affecting pregnancy in third trimester          Follow up Visits:  Follow-up with primary ob/gyn in 2 weeks    Other Discharge Instructions: none    Barbara Redd MD  11/24/2022  9:27 AM

## 2022-11-24 NOTE — PLAN OF CARE
Problem: POSTPARTUM  Goal: Long Term Goal:Experiences normal postpartum course  Description: INTERVENTIONS:  - Assess and monitor vital signs and lab values. - Assess fundus and lochia. - Provide ice/sitz baths for perineum discomfort. - Monitor healing of incision/episiotomy/laceration, and assess for signs and symptoms of infection and hematoma. - Assess bladder function and monitor for bladder distention.  - Provide/instruct/assist with pericare as needed. - Provide VTE prophylaxis as needed. - Monitor bowel function.  - Encourage ambulation and provide assistance as needed. - Assess and monitor emotional status and provide social service/psych resources as needed. - Utilize standard precautions and use personal protective equipment as indicated. Ensure aseptic care of all intravenous lines and invasive tubes/drains.  - Obtain immunization and exposure to communicable diseases history. 11/24/2022 1313 by Komal Mcarthur RN  Outcome: Completed  11/24/2022 0836 by Komal Mcarthur RN  Outcome: Progressing  Goal: Optimize infant feeding at the breast  Description: INTERVENTIONS:  - Initiate breast feeding within first hour after birth. - Monitor effectiveness of current breast feeding efforts. - Assess support systems available to mother/family.  - Identify cultural beliefs/practices regarding lactation, letdown techniques, maternal food preferences. - Assess mother's knowledge and previous experience with breast feeding.  - Provide information as needed about early infant feeding cues (e.g., rooting, lip smacking, sucking fingers/hand) versus late cue of crying.  - Discuss/demonstrate breast feeding aids (e.g., infant sling, nursing footstool/pillows, and breast pumps). - Encourage mother/other family members to express feelings/concerns, and actively listen. - Educate father/SO about benefits of breast feeding and how to manage common lactation challenges.   - Recommend avoidance of specific medications or substances incompatible with breast feeding.  - Assess and monitor for signs of nipple pain/trauma. - Instruct and provide assistance with proper latch. - Review techniques for milk expression (breast pumping) and storage of breast milk. Provide pumping equipment/supplies, instructions and assistance, as needed. - Encourage rooming-in and breast feeding on demand.  - Encourage skin-to-skin contact. - Provide LC support as needed. - Assess for and manage engorgement. - Provide breast feeding education handouts and information on community breast feeding support. 2022 by Celine Crespo RN  Outcome: Completed  2022 by Celine Crespo RN  Outcome: Progressing  Goal: Establishment of adequate milk supply with medication/procedure interruptions  Description: INTERVENTIONS:  - Review techniques for milk expression (breast pumping). - Provide pumping equipment/supplies, instructions, and assistance until it is safe to breastfeed infant. 2022 by Celine Crespo RN  Outcome: Completed  2022 by Celine Crespo RN  Outcome: Progressing  Goal: Experiences normal breast weaning course  Description: INTERVENTIONS:  - Assess for and manage engorgement. - Instruct on breast care. - Provide comfort measures. 2022 by Celine Crespo RN  Outcome: Completed  2022 by Celine Crespo RN  Outcome: Progressing  Goal: Appropriate maternal -  bonding  Description: INTERVENTIONS:  - Assess caregiver- interactions. - Assess caregiver's emotional status and coping mechanisms. - Encourage caregiver to participate in  daily care. - Assess support systems available to mother/family.  - Provide /case management support as needed.   2022 by Celine Crespo RN  Outcome: Completed  2022 by Celine Crespo RN  Outcome: Progressing       Sat with patient and updated patient and family on plan of care. Pain medication discussed and answered all questions. Vitals are WNL. Breastfeeding on demand and breastfeeding successfully. Lochia WNL and fundus is firm. Will call RN with any questions.

## 2022-11-24 NOTE — PROGRESS NOTES
Discharge instructions taught and understood. ID bands verified. When to call primary healthcare provider was discussed and patient states understanding. All questions answered. Signs and symptoms of postpartum depression, hemorrhage, infection, and pre-eclampsia were discussed and all questions answered.  Patient to follow-up in 2 weeks for incision check and 6 weeks for a postpartum visit

## 2022-12-06 ENCOUNTER — NURSE ONLY (OUTPATIENT)
Dept: LACTATION | Facility: HOSPITAL | Age: 27
End: 2022-12-06
Attending: OBSTETRICS & GYNECOLOGY
Payer: COMMERCIAL

## 2022-12-06 PROCEDURE — 99213 OFFICE O/P EST LOW 20 MIN: CPT

## 2022-12-06 NOTE — PATIENT INSTRUCTIONS
[facility] RN, BSN, [place]  [phone]      Naked Weight: 7 lb 7.1 oz   Weight increase: 2 oz in 1 days, different scale (Ped scale)    Recommendations based on today's evaluation: [place] transferred a total of 106 ml at the breast today. Periods of disorganization and intermittent falling off breast along with periods of ineffective suck pattern (visual clicking and use of accessory muscle) at start of feeding may contribute to the inability to reach hindmilk of feeding, evident by green stools and diaper rash. With support and proper positioning [place] was able to form an improved seal leading to an effective suck/swallow pattern. The amount of energy and disorganization/ineffective suck with breastfeeding may have contributed to the weight loss and slow weight gain. Continue weight checks as directed and follow feeding plan discussed and described below. FEEDING PLAN    Breastfeeding frequency: Continue to breast feed with each feeding while observing for signs of an adequate feeding described below. Continue to provide support and use deep latch techniques to ease the formation of an effective seal at breast. If latch becomes painful or ineffective, consider re-latching. Supplementation: If signs of an adequate feeding is not observed or fatigue or ineffective suck/swallow pattern is identified, provide supplement to follow to satiety. Use expressed breast milk if available, if not formula is recommended. Pumping: To protect milk supply, while [place]'s weight stabilizes and as her breastfeeding performance continues to strengthen, pump with each indication a bottle is given and/or for comfort if breast remain full and/or uncomfortable. Follow up:  With pediatrician as scheduled Friday, consider scheduling visit with speech for further evaluation of suck strength/quality, and with lactation as needed for follow up breastfeeding / milk supply assessment. Breastfeeding suggestions if/when supplements are needed    Kangaroo mother care: Snuggle your baby between your breasts in just a diaper and covered with a blanket. Helps to wake a sleepy baby and increases your milk supply. Massage your breasts before nursing or pumping. Breastfeed with hunger cues, most babies will breastfeed 8-12 times every 24 hours with some cluster feeding, especially during growth spurts. Gently wake by 2-3 hours to feed if sleepy. Positioning: Your hand at neck/shoulders, not the back of head. Line chin with the bottom of your areola    Latching on:  Express drops of milk onto your baby's lips to encourage licking. Point your nipple to baby's nose  Stroke nipple lightly down center of lips  Wait for wide mouth with tongue cupped at bottom of mouth. Chin should be deep into breast, with some room between nose and the breast.   If needed, gently draw chin down lower to deepen latch. Is baby taking enough breastmilk? Swallowing with most sucks (every 1-3 sucks) until satisfied at least 8-12 times every 24 hours. (LASTING 15-30 MINUTES BETWEEN BOTH BREASTS)  Compressing the breast when your baby sucks can increase milk flow. At least 6-8 wet diapers and at least 3-4 soft, yellow seedy stools every 24 hours. Use breastfeeding journal.  Weight gain of at least 5-7 ounces per week    Supplementation:         If your baby is not meeting these guidelines for adequate breastfeeding, feed 1-2 oz or more expressed milk or formula with a wide based, slow flow nipple. Increase the amount of supplement until infant is having an adequate output    Paced bottle feeding using a slow flow nipple:   Hold your baby in an upright position, supporting the hand and neck with your hand, rather than in the crook of your arm.    Let you baby \"latch on\" to bottle: stroke nipple down from top lip to bottom, licking is good, wait for wide mouth, tongue cupped at bottom of mouth.  Tip the bottle up just far enough that there is not air in the nipple. Pausing mimics breastfeeding and discourages \"guzzling\" the feeding, allowing infant to take at least 15 minutes to drink the breastmilk or formula. Milk Supply Increase strategies:  Continue to massage your breasts before nursing and/or pumping and provide skin to skin contact with your baby as much as possible. Pump both breasts for 15-20 minutes every 2-3 hours after nursing. (at least 8x/24 hours). If milk supply is low and not responding to above measures within the week:  Discuss use of herbs such as fenugreek with your OB physician and baby's physician. Review contraindications for the the use of herbal agents with lactation consultant prior to the start. Discuss thyroid check with OB physician     To care for nipples until healed:   If too sore to nurse on one or both breasts, pump one (or both) breast(s) to comfort every 3 hours. If nursing to contentment on one breast, this pumped milk can be stored for future use. If not nursing on either breast, feed baby your breast milk until able to return to breast.   Express drops of breast milk on nipples before and after nursing (unless nipple thrush is present). Use a hydrogel type dressing on your nipples between feedings. (Soothies or Ameda ComfortGel pads)  Discuss use of all purpose nipple ointment with your OB doctor. Call doctor if nipple has signs of infection: red/deep pink, drainage (pus), increased pain, fever. Watch for signs of yeast - see handout, \"Breastfeeding Suggestions for Possible Nipple/Breast Yeast (thrush)\"    Prevent plugged ducts and mastitis  Watch for signs of breast infection (mastitis) - painful breast, reddened area, fever, chills or flu-like symptoms - call your OB doctor at once if this occurs.      Follow-up:  Call the [facility] at [phone] with any breastfeeding/pumping questions as needed  Call your pediatrician if any concerns develop  Keep your appointment with baby's doctor as planned        Call you baby's doctor with questions as well. Weight check sooner if wet or stool diapers decrease. Have weight checked again within 1-3 days of decreasing/stopping supplements. For additional information: [employer]. [employer]  Www.[email or web address] [employer]  www.breastfeeding. org  Www.breastfeedingonline. com

## 2022-12-06 NOTE — PROGRESS NOTES
LACTATION NOTE - MOTHER      Evaluation Type: Outpatient Initial    Problems identified  Problems identified: Knowledge deficit;Milk supply WNL  Milk supply not WNL: Reduced (potential) (Infant with disorganized/ineffective feeding patterns)  Problems Identified Other: Roxana Mishra presents with 15 day old [place] for breastfeeding evaluation due to weight loss an slow weight gain concerns. Per pediatrician recommendation, supplementation started 4 days ago due to weight concerns. Last 24 hours exclusive breastfeeding due to gain of 2 oz in 2 days last check. Adequate void frequency, adequate stool frequency althought dark green in color along with diaper rash present. Current naked weight is 7 lb 7.1 oz, increase of 2 oz in 1 day (different scale). Maternal history  Maternal history: Obesity;Caesarean section; Anxiety;Depression  Other/comment: ADHD, asthma, hx of fetal demise last year, has a 3and 3year old at home    Breastfeeding goal  Breastfeeding goal: To maintain breast milk feeding per patient goal    Maternal Assessment  Bilateral Breasts: Symmetrical;Soft (large/pendulous)  Bilateral Nipples: WNL; Colostrum easily expressed; Compression stripe  Prior breastfeeding experience (comment below): Multip; Successful  Prior BF experience: comment: 12-15 months  Breastfeeding Assistance: Breastfeeding assistance provided with permission    Pain assessment  Pain, additional: Pain location;Pain w/initial sucks only  Location/Comment: intermittently  Treatment of Sore Nipples: Deeper latch techniques; Expressed breast milk;Coconut oil; Lanolin    Guidelines for use of:  Breast pump type: Hand Pump; Other  Current use of pump[de-identified] has not pumped with supplementation recommendations, does not currently have a pump at home, Debra in route, reviewed pump renal program option, manual pump provided with instruction  Suggested use of pump: Pump if infant is not latching to breast;Pump each time a supplement is offered; For comfort as needed  Other (comment): Follow up as needed if problems persist/worsen       Recommendations based on today's evaluation: [place] transferred a total of 106 ml at the breast today. Periods of disorganization and intermittent falling off breast along with periods of ineffective suck pattern (visual clicking and use of accessory muscle) at start of feeding may contribute to the inability to reach hindmilk of feeding, evident by green stools and diaper rash. With support and proper positioning [place] was able to form an improved seal leading to an effective suck/swallow pattern. The amount of energy and disorganization/ineffective suck with breastfeeding may have contributed to the weight loss and slow weight gain. Continue weight checks as directed and follow feeding plan discussed and described below. FEEDING PLAN    Breastfeeding frequency: Continue to breast feed with each feeding while observing for signs of an adequate feeding described below. Continue to provide support and use deep latch techniques to ease the formation of an effective seal at breast. If latch becomes painful or ineffective, consider re-latching. Supplementation: If signs of an adequate feeding is not observed or fatigue or ineffective suck/swallow pattern is identified, provide supplement to follow to satiety. Use expressed breast milk if available, if not formula is recommended. Pumping: To protect milk supply, while [place]'s weight stabilizes and as her breastfeeding performance continues to strengthen, pump with each indication a bottle is given and/or for comfort if breast remain full and/or uncomfortable. Follow up: With pediatrician as scheduled Friday, consider scheduling visit with speech for further evaluation of suck strength/quality, and with lactation as needed for follow up breastfeeding / milk supply assessment.

## 2022-12-26 ENCOUNTER — TELEPHONE (OUTPATIENT)
Dept: OBGYN UNIT | Facility: HOSPITAL | Age: 27
End: 2022-12-26

## (undated) DEVICE — EXTRA LARGE, DISPOSABLE C-SECTION PROTECTOR - RETRACTOR: Brand: ALEXIS ® O C-SECTION PROTECTOR - RETRACTOR

## (undated) NOTE — LETTER
[facility]  Administration of Anesthesia  1. Salma Azevedo, or _________________________________ acting on her behalf, (Patient) (Dependent/Representative) request to receive anesthesia for my pending procedure/operation/treatment.   A bleeding, seizure, cardiac arrest and death. 7. AWARENESS: I understand that it is possible (but unlikely) to have explicit memory of events from the operating room while under general anesthesia.   8. ELECTROCONVULSIVE THERAPY PATIENTS: This consent serve below affirms that prior to the time of the procedure, I have explained to the patient and/or his/her guardian, the risks and benefits of undergoing anesthesia, as well as any reasonable alternatives.     ___________________________________________________

## (undated) NOTE — LETTER
[facility]  Administration of Anesthesia  1. Bernarda Cardona, or _________________________________ acting on her behalf, (Patient) (Dependent/Representative) request to receive anesthesia for my pending procedure/operation/treatment.   A bleeding, seizure, cardiac arrest and death. 7. AWARENESS: I understand that it is possible (but unlikely) to have explicit memory of events from the operating room while under general anesthesia.   8. ELECTROCONVULSIVE THERAPY PATIENTS: This consent serve below affirms that prior to the time of the procedure, I have explained to the patient and/or his/her guardian, the risks and benefits of undergoing anesthesia, as well as any reasonable alternatives.     ___________________________________________________